# Patient Record
Sex: FEMALE | Race: ASIAN | NOT HISPANIC OR LATINO | Employment: UNEMPLOYED | URBAN - METROPOLITAN AREA
[De-identification: names, ages, dates, MRNs, and addresses within clinical notes are randomized per-mention and may not be internally consistent; named-entity substitution may affect disease eponyms.]

---

## 2017-01-02 ENCOUNTER — TELEPHONE (OUTPATIENT)
Dept: FAMILY MEDICINE | Facility: CLINIC | Age: 62
End: 2017-01-02

## 2017-01-02 NOTE — TELEPHONE ENCOUNTER
Received a fax from [facility]    **Pt requesting new rx for Loperamide 2 mg. If approve - please fax new rx to Pharmacy. Thank you, Kateryna ext [phone]**    Please advise

## 2017-01-09 ENCOUNTER — TELEPHONE (OUTPATIENT)
Dept: FAMILY MEDICINE | Facility: CLINIC | Age: 62
End: 2017-01-09

## 2017-01-09 DIAGNOSIS — R19.7 DIARRHEA, UNSPECIFIED TYPE: Primary | ICD-10-CM

## 2017-01-09 NOTE — TELEPHONE ENCOUNTER
Patient requesting new RX for Loperamide 2mg, if out please send script to [facility] pharmacy

## 2017-01-16 RX ORDER — LOPERAMIDE HYDROCHLORIDE 2 MG/1
TABLET ORAL
Qty: 30 TABLET | Refills: 0 | Status: SHIPPED | OUTPATIENT
Start: 2017-01-16 | End: 2017-10-13

## 2017-03-10 DIAGNOSIS — Z00.00 ENCOUNTER FOR ROUTINE ADULT HEALTH EXAMINATION WITHOUT ABNORMAL FINDINGS: ICD-10-CM

## 2017-03-23 ENCOUNTER — TRANSFERRED RECORDS (OUTPATIENT)
Dept: HEALTH INFORMATION MANAGEMENT | Facility: CLINIC | Age: 62
End: 2017-03-23

## 2017-06-07 DIAGNOSIS — I10 BENIGN ESSENTIAL HYPERTENSION: ICD-10-CM

## 2017-06-07 DIAGNOSIS — E78.5 DYSLIPIDEMIA: ICD-10-CM

## 2017-06-08 RX ORDER — SIMVASTATIN 20 MG
20 TABLET ORAL AT BEDTIME
Qty: 90 TABLET | Refills: 3 | Status: SHIPPED | OUTPATIENT
Start: 2017-06-08 | End: 2018-06-04

## 2017-06-08 RX ORDER — HYDROCHLOROTHIAZIDE 25 MG/1
25 TABLET ORAL DAILY
Qty: 90 TABLET | Refills: 3 | Status: SHIPPED | OUTPATIENT
Start: 2017-06-08 | End: 2018-06-04

## 2017-08-01 DIAGNOSIS — K21.9 GASTROESOPHAGEAL REFLUX DISEASE WITHOUT ESOPHAGITIS: ICD-10-CM

## 2017-08-01 DIAGNOSIS — M77.8 TENDONITIS OF ELBOW, LEFT: ICD-10-CM

## 2017-09-17 ENCOUNTER — HEALTH MAINTENANCE LETTER (OUTPATIENT)
Age: 62
End: 2017-09-17

## 2017-10-13 ENCOUNTER — OFFICE VISIT (OUTPATIENT)
Dept: FAMILY MEDICINE | Facility: CLINIC | Age: 62
End: 2017-10-13

## 2017-10-13 VITALS
DIASTOLIC BLOOD PRESSURE: 87 MMHG | WEIGHT: 149.25 LBS | OXYGEN SATURATION: 97 % | BODY MASS INDEX: 30.09 KG/M2 | SYSTOLIC BLOOD PRESSURE: 131 MMHG | HEART RATE: 82 BPM | TEMPERATURE: 98.2 F | HEIGHT: 59 IN

## 2017-10-13 DIAGNOSIS — Z23 NEED FOR PROPHYLACTIC VACCINATION AND INOCULATION AGAINST INFLUENZA: ICD-10-CM

## 2017-10-13 DIAGNOSIS — Z12.11 SPECIAL SCREENING FOR MALIGNANT NEOPLASMS, COLON: ICD-10-CM

## 2017-10-13 DIAGNOSIS — M25.50 MULTIPLE JOINT PAIN: ICD-10-CM

## 2017-10-13 DIAGNOSIS — J06.9 VIRAL URI WITH COUGH: ICD-10-CM

## 2017-10-13 DIAGNOSIS — R73.03 PREDIABETES: ICD-10-CM

## 2017-10-13 DIAGNOSIS — Z00.00 ROUTINE GENERAL MEDICAL EXAMINATION AT A HEALTH CARE FACILITY: Primary | ICD-10-CM

## 2017-10-13 LAB — GLUCOSE BLDC GLUCOMTR-MCNC: 109 MG/DL (ref 51–110)

## 2017-10-13 RX ORDER — DEXTROMETHORPHAN POLISTIREX 30 MG/5ML
30 SUSPENSION ORAL 2 TIMES DAILY PRN
Qty: 89 ML | Refills: 1 | Status: SHIPPED | OUTPATIENT
Start: 2017-10-13 | End: 2017-11-09

## 2017-10-13 RX ORDER — ACETAMINOPHEN 325 MG/1
650 TABLET ORAL 3 TIMES DAILY PRN
Qty: 100 TABLET | Refills: 0 | Status: SHIPPED | OUTPATIENT
Start: 2017-10-13 | End: 2017-12-05

## 2017-10-13 NOTE — LETTER
October 24, 2017      Priscila Watkins  [address] [address]    Please see below for your test results.    Resulted Orders   Glucose By Meter - Accucheck ([facility])   Result Value Ref Range    Glucose Whole Blood 109.0 51.0 - 110.0 mg/dL   GYN Cytology ([facility])   Result Value Ref Range    Lab AP Case Report SEE RESULTS BELOW       Comment:      Gynecologic Cytology Report                       Case: [id number]                                     Authorizing Provider:  Lainey Tracy MD        Collected:             10/13/2017 1317              First Screen:          KITTY Rodrigues (ASCP)   Received:              10/16/2017 0936              Specimen:    SUREPATH PAP, SCREENING, Endocervical/cervical                                               Lab AP Gyn Interpretation SEE RESULTS BELOW       Comment:      Negative for squamous intraepithelial lesion or malignancy  Electronically signed by KITTY Rodrigues (ASCP) on 10/20/2017 at  3:21 PM      Lab AP Malignant? Normal Normal    Specimen adequacy:       Satisfactory for evaluation, endocervical/transformation zone component present    HPV Reflex? Yes regardless of result     High Risk? No     Last Mens Period post-menopausal     Lab AP Abnormal Bleeding No     Lab AP Patient Status n/a     Lab AP Birth Control/Hormones None     Lab AP Previous Normal 2008     Lab AP Previous Abnl no     Lab AP Cervical Appearance erythematous     Narrative    Test performed by:  [facility] [address] [place] [address]   HPV [place] PCR ([facility])   Result Value Ref Range    Interpretation No HPV Type(s) Detected No HPV Type(s) Detected, No High Risk HP     Eliceo Hernandez MD, [employer]       Comment:         Testing was performed at [facility], [address] [place] [address], with final verification at the indicated laboratory.    Interpretation was performed at [facility] [address] [place] [address]  [place] [address].      Narrative    Test performed by:  [facility] [address] [place] [address]  No HPV Type(s) Detected  Interpretation: The sample is negative for the presence of DNA from one or   more of the following high risk HPV types (16, 18, 31, 33, 35, 39, 45, 51, 52,   56, 58, 66, and 68) and/or probable high or low risk HPV types (2a, 6, 11,   26, 30, 32, 34, 42, 43, 44, 53, 54, 55, 57, 61, 62, 67, 69, 70, 71, 72, 73,   74, 77, 81, 82, 83, 84, 85, 87, 89).  High risk types are classified by the   IARC as carcinogenicity, probably, or possible carcinogenic to humans.    According to the IARC, low risk types are not classifiable as to their   carcingenicity to humans. These results do not exclude the possibility of   additional low or high risk HPV types not detected due to adequacy and   representativeness of sampling or assay sensitivity.  Comments: The absence of low and or high risk HPV types reduces the collective   risk for the development of cervical dysplasia or neoplasia.  This result, in   association with the morphology assessment of the Pap sample are landry   information for the determination of follow-up testing and in the clinical   management of the patient.  Methodology:  Genomic DNA was extracted from the submitted specimen and   amplified by the polymerase chain reaction (PCR) using consensus   oligonucleotide primers for the L1 region of the human papillomavirus (HPV)   genome.  Concurrently, the integrity of the extracted DNA was evaluated by the   amplification of beta-globin, a common housekeeping gene.  Result   interpretation is performed by analysis of peak height and size data generated   through fluorescence detection by automated electrophoresis.  HPV DNA   positive PCR products were subjected to digestion by the restriction   endonucleases Hae III, Pst 1, and Rsa 1.  Digested DNA fragments were    by automated electrophoresis.   Digital electropherograms and gel   images of data were generated and the specific HPV type was determined by   matching the restriction fragment patterns of the respective specimens to that   of known HPV restriction fragment patterns.  The analytical and performance   characteristics of this laboratory-developed test (LDT) were determined by   [facility] pursuant to Clinical Laboratory Improvement Amendments (CLIA 88)   requirements.  It has not been cleared or approved by the U.S. Food and Drug   Administration (FDA).  The FDA has determined that such clearance or approval   is not a requirement prior to use for clinical purposes.     Ms Watkins  I'm covering for Dr Tracy.  Your pap test was normal.  A repeat pap may be done again in 5 years.  Please let us know if you have questions.  ROSA Blackwell

## 2017-10-13 NOTE — LETTER
October 13, 2017      Priscila Watkins  [address] [address]        Dear Priscila,    Please see below for your test results.  Your sugar test was a little high again, but no diabetes.  We should check this every year.     Resulted Orders   Glucose By Meter - Accucheck ([facility])   Result Value Ref Range    Glucose Whole Blood 109.0 51.0 - 110.0 mg/dL       If you have any questions, please call the clinic to make an appointment.    Sincerely,    Lainey Tracy MD

## 2017-10-13 NOTE — NURSING NOTE
Priscila Watkins      1.  Has the patient received the information for the influenza vaccine? YES    2.  Does the patient have any of the following contraindications?     Allergy to eggs? No     Allergic reaction to previous influenza vaccines? No     Any other problems to previous influenza vaccines? No     Paralyzed by Guillain-[place]oh[place] syndrome? No     Currently pregnant? NO     Current moderate or severe illness? No    Vaccination given by Caroline Seay, [facility].  [facility]    May we mail normal lab results to your home? Yes    Patient's preferred contact method if labs are abnormal  Letter mailed to home address

## 2017-10-13 NOTE — PATIENT INSTRUCTIONS
We will contact [facility] for your Mammogram results    Medicine for joint pain sent in to your pharmacy    Cough syrup sent to your pharmacy today    Stop at the lab for a glucose check    Flu shot given today    Preventive Health Recommendations  Female Ages 50 - 64    Yearly exam: See your health care provider every year in order to  o Review health changes.   o Discuss preventive care.    o Review your medicines if your doctor has prescribed any.      Get a Pap test every three years (unless you have an abnormal result and your provider advises testing more often).    If you get Pap tests with HPV test, you only need to test every 5 years, unless you have an abnormal result.     You do not need a Pap test if your uterus was removed (hysterectomy) and you have not had cancer.    You should be tested each year for STDs (sexually transmitted diseases) if you're at risk.     Have a mammogram every 1 to 2 years.    Have a colonoscopy at age 50, or have a yearly FIT test (stool test). These exams screen for colon cancer.      Have a cholesterol test every 5 years, or more often if advised.    Have a diabetes test (fasting glucose) every three years. If you are at risk for diabetes, you should have this test more often.     If you are at risk for osteoporosis (brittle bone disease), think about having a bone density scan (DEXA).    Shots: Get a flu shot each year. Get a tetanus shot every 10 years.    Nutrition:     Eat at least 5 servings of fruits and vegetables each day.    Eat whole-grain bread, whole-wheat pasta and brown rice instead of white grains and rice.    Talk to your provider about Calcium and Vitamin D.     Lifestyle    Exercise at least 150 minutes a week (30 minutes a day, 5 days a week). This will help you control your weight and prevent disease.    Limit alcohol to one drink per day.    No smoking.     Wear sunscreen to prevent skin cancer.     See your dentist every six months for an exam and  cleaning.    See your eye doctor every 1 to 2 years.    Thank you for coming to [facility].  **If you had lab testing today and your results are reassuring or normal they will be be mailed to you within 7 days.   **If the lab tests need quick action we will call you with the results.  The phone number we will call with results is # [phone] (home) . If this is not the best number please call our clinic and change the number.  If you need any refills please call your pharmacy and they will contact us.  If you have any concerns about today's visit or wish to schedule another appointment please call our office during normal business hours [phone] (8-5:00 M-F)  If you have urgent medical concerns please call [phone] at any time of the day.  If you a medical emergency please call [phone]  Again thank you for choosing [facility] and please let us know how we can best partner with you to improve you and your family's health.

## 2017-10-13 NOTE — MR AVS SNAPSHOT
After Visit Summary   10/13/2017    Priscila Watkins    MRN: [id number]           Patient Information     Date Of Birth          1955        Visit Information        Provider Department      10/13/2017 10:20 AM Lainey Tracy MD [facility]        Today's Diagnoses     Routine general medical examination at a health care facility    -  1    Prediabetes        Need for prophylactic vaccination and inoculation against influenza        Special screening for malignant neoplasms, colon        Viral URI with cough        Multiple joint pain          Care Instructions    We will contact [facility] for your Mammogram results    Medicine for joint pain sent in to your pharmacy    Cough syrup sent to your pharmacy today    Stop at the lab for a glucose check    Flu shot given today    Preventive Health Recommendations  Female Ages 50 - 64    Yearly exam: See your health care provider every year in order to  o Review health changes.   o Discuss preventive care.    o Review your medicines if your doctor has prescribed any.      Get a Pap test every three years (unless you have an abnormal result and your provider advises testing more often).    If you get Pap tests with HPV test, you only need to test every 5 years, unless you have an abnormal result.     You do not need a Pap test if your uterus was removed (hysterectomy) and you have not had cancer.    You should be tested each year for STDs (sexually transmitted diseases) if you're at risk.     Have a mammogram every 1 to 2 years.    Have a colonoscopy at age 50, or have a yearly FIT test (stool test). These exams screen for colon cancer.      Have a cholesterol test every 5 years, or more often if advised.    Have a diabetes test (fasting glucose) every three years. If you are at risk for diabetes, you should have this test more often.     If you are at risk for osteoporosis (brittle bone disease), think about having a bone density scan  (DEXA).    Shots: Get a flu shot each year. Get a tetanus shot every 10 years.    Nutrition:     Eat at least 5 servings of fruits and vegetables each day.    Eat whole-grain bread, whole-wheat pasta and brown rice instead of white grains and rice.    Talk to your provider about Calcium and Vitamin D.     Lifestyle    Exercise at least 150 minutes a week (30 minutes a day, 5 days a week). This will help you control your weight and prevent disease.    Limit alcohol to one drink per day.    No smoking.     Wear sunscreen to prevent skin cancer.     See your dentist every six months for an exam and cleaning.    See your eye doctor every 1 to 2 years.    Thank you for coming to [facility].  **If you had lab testing today and your results are reassuring or normal they will be be mailed to you within 7 days.   **If the lab tests need quick action we will call you with the results.  The phone number we will call with results is # [phone] (home) . If this is not the best number please call our clinic and change the number.  If you need any refills please call your pharmacy and they will contact us.  If you have any concerns about today's visit or wish to schedule another appointment please call our office during normal business hours [phone] (8-5:00 M-F)  If you have urgent medical concerns please call [phone] at any time of the day.  If you a medical emergency please call [phone]  Again thank you for choosing [facility] and please let us know how we can best partner with you to improve you and your family's health.                Follow-ups after your visit        Your next 10 appointments already scheduled     Oct 20, 2017 10:30 AM CDT   Flu Shot with BT [facility]   [facility] ([facility])    [address] [facility] [address]   [phone]              Future tests that were ordered for you today     Open Future Orders        Priority Expected Expires Ordered    Fecal Occult Blood -  "FIT, iFOB ([facility]) Routine  10/20/2017 10/13/2017            Who to contact     Please call your clinic at [phone] to:    Ask questions about your health    Make or cancel appointments    Discuss your medicines    Learn about your test results    Speak to your doctor   If you have compliments or concerns about an experience at your clinic, or if you wish to file a complaint, please contact [facility] Patient Relations at [phone] or email us at [email or web address]         Additional Information About Your Visit        [employer] Information     [employer] is an electronic gateway that provides easy, online access to your medical records. With [employer], you can request a clinic appointment, read your test results, renew a prescription or communicate with your care team.     To sign up for [employer] visit the website at [email or web address]   You will be asked to enter the access code listed below, as well as some personal information. Please follow the directions to create your username and password.     Your access code is: [email or web address]  Expires: 2018 12:56 PM     Your access code will  in 90 days. If you need help or a new code, please contact your [facility] Clinic or call [phone] for assistance.        Care EveryWhere ID     This is your Care EveryWhere ID. This could be used by other organizations to access your [facility] medical records  [id number]        Your Vitals Were     Pulse Temperature Height Pulse Oximetry BMI (Body Mass Index)       82 98.2  F (36.8  C) (Oral) 4' 11.45\" (151 cm) 97% 29.69 kg/m2        Blood Pressure from Last 3 Encounters:   10/13/17 131/87   16 129/84   16 129/81    Weight from Last 3 Encounters:   10/13/17 149 lb 4 oz (67.7 kg)   16 151 lb (68.5 kg)   16 158 lb 8 oz (71.9 kg)              We Performed the Following     ADMIN VACCINE, INITIAL     FLU VAC QUADRIVLENT SPLIT " VIRUS IM 0.5ml dosage     Glucose By Meter - Accucheck ([facility])          Today's Medication Changes          These changes are accurate as of: 10/13/17 12:56 PM.  If you have any questions, ask your nurse or doctor.               Start taking these medicines.        Dose/Directions    dextromethorphan 30 MG/5ML liquid   Commonly known as:  DELSYM   Used for:  Viral URI with cough   Started by:  Lainey Tracy MD        Dose:  30 mg   Take 5 mLs (30 mg) by mouth 2 times daily as needed for cough   Quantity:  89 mL   Refills:  1         These medicines have changed or have updated prescriptions.        Dose/Directions    * acetaminophen 500 MG tablet   Commonly known as:  TYLENOL   This may have changed:  Another medication with the same name was added. Make sure you understand how and when to take each.   Used for:  Lumbosacral spondylosis without myelopathy   Changed by:  Viktoriya Wallace MD        Dose:  500-1000 mg   Take 1-2 tablets (500-1,000 mg) by mouth every 6 hours as needed Maximum of 6 pills per day.   Quantity:  120 tablet   Refills:  1       * acetaminophen 325 MG tablet   Commonly known as:  TYLENOL   This may have changed:  You were already taking a medication with the same name, and this prescription was added. Make sure you understand how and when to take each.   Used for:  Multiple joint pain   Changed by:  Lainey Tracy MD        Dose:  650 mg   Take 2 tablets (650 mg) by mouth 3 times daily as needed (joint pain)   Quantity:  100 tablet   Refills:  0       * Notice:  This list has 2 medication(s) that are the same as other medications prescribed for you. Read the directions carefully, and ask your doctor or other care provider to review them with you.         Where to get your medicines      These medications were sent to [facility] - [place], [place] - [facility] [address]  [address], [place] [address]     Phone:  [phone]     acetaminophen 325 MG tablet     dextromethorphan 30 MG/5ML liquid                Primary Care Provider Office Phone # Fax #    Lainey COBURN MD [place] [phone]       [facility] [address] [place] [address]        Equal Access to Services     JOSHUA SNOWDEN : Hadii asif penny rafyo Soomaali, waaxda luqadaha, qaybta kaalmada adeyomaiaryada, benson jenisein hayaashwetha davisyomaira felix woody li. So [facility] [phone].    ATENCIÓN: Si habla español, tiene a maki disposición servicios gratuitos de asistencia lingüística. Llame al [phone].    We comply with applicable federal civil rights laws and [place] laws. We do not discriminate on the basis of race, color, national origin, age, disability, sex, sexual orientation, or gender identity.            Thank you!     Thank you for choosing [facility]  for your care. Our goal is always to provide you with excellent care. Hearing back from our patients is one way we can continue to improve our services. Please take a few minutes to complete the written survey that you may receive in the mail after your visit with us. Thank you!             Your Updated Medication List - Protect others around you: Learn how to safely use, store and throw away your medicines at www.disposemymeds.org.          This list is accurate as of: 10/13/17 12:56 PM.  Always use your most recent med list.                   Brand Name Dispense Instructions for use Diagnosis    * acetaminophen 500 MG tablet    TYLENOL    120 tablet    Take 1-2 tablets (500-1,000 mg) by mouth every 6 hours as needed Maximum of 6 pills per day.    Lumbosacral spondylosis without myelopathy       * acetaminophen 325 MG tablet    TYLENOL    100 tablet    Take 2 tablets (650 mg) by mouth 3 times daily as needed (joint pain)    Multiple joint pain       atovaquone-proguanil 250-100 MG per tablet    MALARONE    50 tablet    Take 1 tablet by mouth daily    Encounter for counseling       * calcium carbonate-vitamin D 500-400 MG-UNIT Tabs per  tablet     180 tablet    Take 1 tablet by mouth daily    Vitamin D deficiency       * calcium carb 1250 mg (500 mg [place])/vitamin D 200 units 500-200 MG-UNIT per tablet    OSCAL with D    90 tablet    Take 1 tablet by mouth 2 times daily (with meals)    Encounter for routine adult health examination without abnormal findings       cholecalciferol 1000 UNIT tablet    vitamin D    100 tablet    Take 1 tablet (1,000 Units) by mouth daily    Hypovitaminosis D       dextromethorphan 30 MG/5ML liquid    DELSYM    89 mL    Take 5 mLs (30 mg) by mouth 2 times daily as needed for cough    Viral URI with cough       diphenhydrAMINE 25 MG tablet    BENADRYL    30 tablet    Take 1-2 tablets by mouth every 6 hours as needed for itching for 11 days.    Urticaria       hydrochlorothiazide 25 MG tablet    HYDRODIURIL    90 tablet    Take 1 tablet (25 mg) by mouth daily    Benign essential hypertension       loperamide 2 MG tablet    IMODIUM A-D    30 tablet    Take 2 tabs (4 mg) after first loose stool, and then take one tab (2 mg) after each diarrheal stool.  Max of 8 tabs (16 mg) per day.    Diarrhea, unspecified type       naproxen 375 MG tablet    NAPROSYN    180 tablet    Take 1 tablet (375 mg) by mouth 2 times daily (with meals)    Tendonitis of elbow, left       ranitidine 150 MG tablet    ZANTAC    180 tablet    Take 1 tablet (150 mg) by mouth 2 times daily as needed for heartburn    Gastroesophageal reflux disease without esophagitis       simvastatin 20 MG tablet    ZOCOR    90 tablet    Take 1 tablet (20 mg) by mouth At Bedtime    Dyslipidemia       * Notice:  This list has 4 medication(s) that are the same as other medications prescribed for you. Read the directions carefully, and ask your doctor or other care provider to review them with you.

## 2017-10-13 NOTE — PROGRESS NOTES
Female Physical Note    Concerns today:   -- Joint pain.  This is been present for 1 month without any associated injury or trauma.  It is most of the legs, knees, and elbows bilaterally.  No associated swelling, redness, or tenderness.  -- Nasal congestion and cough for 1 week.  The cough is nonproductive, and she denies fever or chills.  There is little bit of shortness of breath but no wheezing.    A BlogBus  was used for this visit.    ROS:  GENERAL: No change in weight, sleep or appetite.  Normal energy.  No fever or chills  EYES: Endorses blurry vision in right eye  ENT: No problems with nose or throat.  No difficulty swallowing.  Has known hearing loss.  RESP: Endorses cough and shortness of breath, but no wheezing.  CV: No chest pains or palpitations  GI: No nausea, vomiting,  heartburn, abdominal pain, diarrhea, constipation or change in bowel habits  : No urinary frequency or dysuria, bladder or kidney problems  MUSCULOSKELETAL: Endorses pain of multiple joints.  NEUROLOGIC: No headaches, numbness, tingling, weakness, problems with balance or coordination  PSYCHIATRIC: No problems with anxiety, depression or mental health  HEME/IMMUNE/ALLERGY: No history of bleeding or clotting problems or anemia.  No allergies or immune system problems  ENDOCRINE: No history of thyroid disease, diabetes or other endocrine disorders  SKIN: No rashes,worrisome lesions or skin problems    Sexually Active: No  Sexual concerns: No   Contraception:not needed  No LMP recorded. Patient is postmenopausal.   STD History: Neg  Last Pap Smear Date: 2008, per records  Abnormal Pap History: None    Patient Active Problem List   Diagnosis     Essential hypertension, benign     Lumbosacral spondylosis without myelopathy     Health Care Home     Medial epicondylitis     Hearing loss of both ears     Prediabetes       Current Outpatient Prescriptions   Medication Sig Dispense Refill     dextromethorphan (DELSYM) 30 MG/5ML  liquid Take 5 mLs (30 mg) by mouth 2 times daily as needed for cough 89 mL 1     acetaminophen (TYLENOL) 325 MG tablet Take 2 tablets (650 mg) by mouth 3 times daily as needed (joint pain) 100 tablet 0     naproxen (NAPROSYN) 375 MG tablet Take 1 tablet (375 mg) by mouth 2 times daily (with meals) 180 tablet 3     ranitidine (ZANTAC) 150 MG tablet Take 1 tablet (150 mg) by mouth 2 times daily as needed for heartburn 180 tablet 3     simvastatin (ZOCOR) 20 MG tablet Take 1 tablet (20 mg) by mouth At Bedtime 90 tablet 3     hydrochlorothiazide (HYDRODIURIL) 25 MG tablet Take 1 tablet (25 mg) by mouth daily 90 tablet 3     diphenhydrAMINE (BENADRYL) 25 MG tablet Take 1-2 tablets by mouth every 6 hours as needed for itching for 11 days. 30 tablet 0       History reviewed. No pertinent past medical history.   Patient Active Problem List    Diagnosis Date Noted     Prediabetes 10/13/2017     Priority: Medium     Lab Results   Component Value Date    A1C 5.9 06/30/2016            Hearing loss of both ears 05/29/2014     Priority: Medium     Bilateral profound hearing loss.  ENT note May 2014.       Essential hypertension, benign 12/03/2012     Priority: Medium     Lumbosacral spondylosis without myelopathy 12/03/2012     Priority: Medium     Health Care Home 12/03/2012     Priority: Medium     Tier Level: 1  DX V65.8 REPLACED WITH 09523 HEALTH CARE HOME (04/08/2013)       Medial epicondylitis 12/03/2012     Priority: Medium        Family History        Negative family history of: DIABETES, CANCER, HEART DISEASE          Problem List Medication List and Allergy List were reviewed.    Patient is an established patient of this clinic.    Social History   Substance Use Topics     Smoking status: Never Smoker     Smokeless tobacco: Never Used     Alcohol use No     , 3 children.    Has anyone hurt you physically, for example by pushing, hitting, slapping or kicking you or forcing you to have sex? Denies  Do you feel  "threatened or controlled by a partner, ex-partner or anyone in your life? Denies    RISK BEHAVIORS AND HEALTHY HABITS:  Tobacco Use/Smoking: None  Illicit Drug Use: None  Do you use alcohol? No  Diet (5-7 servings of fruits/veg daily): Yes   Exercise (30 min accumulated most days):Yes, \"pedal machine\" 3-4 days a week  Dental Care: Yes   Calcium 1500 mg/d:  No  Seat Belt Use: Yes     Cholesterol Level (>46 yo or at risk):  Last done in 2016:  The 10-year ASCVD risk score (Ya SCOTT Jr, et al., 2013) is: 4%    Values used to calculate the score:      Age: 62 years      Sex: Female      Is Non- : No      Diabetic: No      Tobacco smoker: No      Systolic Blood Pressure: 129 mmHg      Is BP treated: No      HDL Cholesterol: 48.9 mg/dL      Total Cholesterol: 173.3 mg/dL    Pap/HPV cotest every 5 years for women 30-65: Recommended and patient accepted testing    Colon CA Screening (>50-75 ):  Recommended and patient accepted testing.  FIT negative 10/16/15    Breast CA Screening (>41 yo or 10 y before 1st degree relative diagnosis): Mammogram last done March 2017 - negative    Immunization History   Administered Date(s) Administered     HepA-Adult 12/29/2016     HepB 11/09/2007, 09/30/2008, 12/29/2008     Influenza (IIV3) 11/03/2008, 09/15/2009, 10/08/2010, 09/27/2011, 09/10/2013     Influenza Vaccine, 3 YRS +, IM (QUADRIVALENT W/PRESERVATIVES) 10/13/2015     MMR 12/29/2008     TD (ADULT, 7+) 11/09/2007, 12/29/2008     Tdap (Adacel,Boostrix) 09/30/2008     Typhoid IM 12/29/2016    Reviewed Immunization Record Today    EXAMINATION:   /87  Pulse 82  Temp 98.2  F (36.8  C) (Oral)  Ht 4' 11.45\" (151 cm)  Wt 149 lb 4 oz (67.7 kg)  SpO2 97%  BMI 29.69 kg/m2  GENERAL: healthy, alert and no distress  EYES: Eyes grossly normal to inspection, extraocular movements - intact, and PERRL  HENT: ear canals- normal; TMs- normal; Nose- normal; Mouth- no ulcers, no lesions  NECK: no tenderness, no " adenopathy, no asymmetry, no masses, no stiffness; thyroid- normal to palpation  RESP: lungs clear to auscultation - no rales, no rhonchi, no wheezes  CV: regular rates and rhythm, normal S1 S2, no S3 or S4 and no murmur, no click or rub -  ABDOMEN: soft, no tenderness, no  hepatosplenomegaly, no masses, normal bowel sounds  MS: extremities- no gross deformities noted, no edema  SKIN: no suspicious lesions, no rashes  NEURO: strength and tone- normal, sensory exam- grossly normal, mentation- intact, speech- normal, reflexes- symmetric  BACK: no CVA tenderness, no paralumbar tenderness  - female: cervix- erythematous, no discharge  PSYCH: Alert; speech- coherent , normal rate and volume; able to articulate logical thoughts, able to abstract reason, no tangential thoughts, no hallucinations or delusions, affect- normal  LYMPHATICS: ant. cervical- normal, post. cervical- normal, axillary- normal, supraclavicular- normal    Lab Results   Component Value Date    A1C 5.9 06/30/2016     Results for orders placed or performed in visit on 10/13/17   Glucose By Meter - Accucheck ([facility])   Result Value Ref Range    Glucose Whole Blood 109.0 51.0 - 110.0 mg/dL     ASSESSMENT & PLAN:  Cancer screening:  -- Cervical: Pap last done in 2008 (normal per chart), performed today w/ HPV reflex.  -- Breast: Mammogram last done in 2017 (negative).  Continue mammography at least q2y.  -- Colon: FIT last done in 2015 (negative), will repeat today.  Recommend annually.    Disease screening/pervention:  -- History of prediabetes (A1c 5.9 in 2016), sugar 109 today.  Continue annual monitoring for diabetes.  -- History of HTN, on HCTZ.  Declined blood draw.  If amenable next time, should repeat BMP.  BP good today - 131/87.  -- Flu shot given today.    Other:  -- Viral cough with URI.  Ordered cough suppressant syrup (dextromethorphan).  -- Joint pain.  Refilled acetaminophen.    Return to clinic as needed.

## 2017-10-17 DIAGNOSIS — Z12.11 SPECIAL SCREENING FOR MALIGNANT NEOPLASMS, COLON: ICD-10-CM

## 2017-10-17 LAB — HEMOCCULT STL QL IA: NEGATIVE

## 2017-10-19 LAB
INTERPRETATION: NORMAL
INTERPRETER: NORMAL

## 2017-10-20 ENCOUNTER — RECORDS - HEALTHEAST (OUTPATIENT)
Dept: ADMINISTRATIVE | Facility: OTHER | Age: 62
End: 2017-10-20

## 2017-10-20 LAB
CYTOLOGY CVX/VAG DOC THIN PREP: NORMAL
HIGH RISK?: NO
HPV REFLEX?: NORMAL
LAB AP ABNORMAL BLEEDING: NO
LAB AP BIRTH CONTROL/HORMONES: NORMAL
LAB AP CASE REPORT: NORMAL
LAB AP CERVICAL APPEARANCE: NORMAL
LAB AP MALIGNANT?: NORMAL
LAB AP PATIENT STATUS: NORMAL
LAB AP PREVIOUS ABNL: NO
LAB AP PREVIOUS NORMAL: 2008
LAST MENS PERIOD: NORMAL
SPECIMEN ADEQUACY:: NORMAL

## 2017-10-22 NOTE — PROGRESS NOTES
Ms Watkins  I'm covering for Dr Tracy.  Your pap test was normal.  A repeat pap may be done again in 5 years.  Please let us know if you have questions.  ROSA Blackwell

## 2017-11-09 DIAGNOSIS — J06.9 VIRAL URI WITH COUGH: ICD-10-CM

## 2017-11-09 RX ORDER — DEXTROMETHORPHAN POLISTIREX 30 MG/5ML
30 SUSPENSION ORAL 2 TIMES DAILY PRN
Qty: 89 ML | Refills: 1 | Status: SHIPPED | OUTPATIENT
Start: 2017-11-09 | End: 2018-08-08

## 2017-12-05 DIAGNOSIS — M25.50 MULTIPLE JOINT PAIN: ICD-10-CM

## 2017-12-05 RX ORDER — ACETAMINOPHEN 325 MG/1
650 TABLET ORAL 3 TIMES DAILY PRN
Qty: 100 TABLET | Refills: 0 | Status: SHIPPED | OUTPATIENT
Start: 2017-12-05 | End: 2018-01-09

## 2018-01-09 DIAGNOSIS — M25.50 MULTIPLE JOINT PAIN: ICD-10-CM

## 2018-01-10 RX ORDER — ACETAMINOPHEN 325 MG/1
650 TABLET ORAL 3 TIMES DAILY PRN
Qty: 100 TABLET | Refills: 0 | Status: SHIPPED | OUTPATIENT
Start: 2018-01-10 | End: 2019-09-24

## 2018-02-07 ENCOUNTER — OFFICE VISIT (OUTPATIENT)
Dept: FAMILY MEDICINE | Facility: CLINIC | Age: 63
End: 2018-02-07
Payer: COMMERCIAL

## 2018-02-07 ENCOUNTER — TRANSFERRED RECORDS (OUTPATIENT)
Dept: HEALTH INFORMATION MANAGEMENT | Facility: CLINIC | Age: 63
End: 2018-02-07

## 2018-02-07 VITALS
SYSTOLIC BLOOD PRESSURE: 149 MMHG | BODY MASS INDEX: 30.16 KG/M2 | OXYGEN SATURATION: 98 % | WEIGHT: 151.6 LBS | TEMPERATURE: 98.7 F | DIASTOLIC BLOOD PRESSURE: 87 MMHG | HEART RATE: 83 BPM

## 2018-02-07 DIAGNOSIS — Z01.818 PREOP GENERAL PHYSICAL EXAM: Primary | ICD-10-CM

## 2018-02-07 LAB
BUN SERPL-MCNC: 16.1 MG/DL (ref 7–19)
CALCIUM SERPL-MCNC: 9.6 MG/DL (ref 8.5–10.1)
CHLORIDE SERPLBLD-SCNC: 98 MMOL/L (ref 98–110)
CO2 SERPL-SCNC: 24.8 MMOL/L (ref 20–32)
CREAT SERPL-MCNC: 0.8 MG/DL (ref 0.5–1)
GFR SERPL CREATININE-BSD FRML MDRD: 77.2 ML/MIN/1.7 M2
GLUCOSE SERPL-MCNC: 120.3 MG'DL (ref 70–99)
HEMOGLOBIN: 11 G/DL (ref 11.7–15.7)
POTASSIUM SERPL-SCNC: 3.4 MMOL/DL (ref 3.2–4.6)
SODIUM SERPL-SCNC: 134.6 MMOL/L (ref 132–142)

## 2018-02-07 NOTE — PROGRESS NOTES
Preceptor attestation:  Patient seen and discussed with the resident. Assessment and plan reviewed with resident and agreed upon.  Supervising physician: Conor Evangelista  [facility]

## 2018-02-07 NOTE — PROGRESS NOTES
[address] [address]  Phone: [phone]  Fax: [phone]    2/7/2018    Adult PRE-OP Evaluation:    Priscila Watkins, 1955 presents for pre-operative evaluation and assessment as requested by hand surgeon, prior to undergoing surgery/procedure for treatment of right hand  Proposed procedure: ORIF of wrist/hand    Date of Surgery/ Procedure: 2/8/18  Hospital/Surgical Facility: [place], Fax: [phone]     Primary Physician: Lainey Tracy  Type of Anesthesia Anticipated: General and Spinal  History of anesthesia complications: NONE  History of  abnormal bleeding: NONE   History of blood transfusions: NO  Patient has a Health Care Directive or Living Will:  NO    Preoperative Questions   1. NO - Do you have a history of heart attack, stroke, stent, bypass or surgery on an artery in the head, neck, heart or legs?  2. NO - Do you ever have any pain or discomfort in your chest?  3. NO - Have you ever had a severe pain across the front of your chest lasting for half an hour or more?  4. NO - Do you have a history of Congestive Heart Failure?  5. NO - Are you troubled by shortness of breath when: walking on the level/ up a slight hill/ at night?  6. NO - Does your chest ever sound wheezy or whistling?  7. NO - Do you currently have a cold, bronchitis or other respiratory infection?  8. NO - Have you had a cold, bronchitis or other respiratory infection within the last 2 weeks?  9. NO - Do you usually have a cough?  10. NO - Do you sometimes get pains in the calves of your legs when you walk?  11. NO - Do you or anyone in your family have previous history of blood clots?  12. NO - Do you or does anyone in your family have a serious bleeding problem such as prolonged bleeding following surgeries or cuts?  13. NO - Have you ever had problems with anemia or been told to take iron pills?  14. NO - Have you had any abnormal blood loss such as black, tarry or bloody stools, or abnormal  vaginal bleeding?  15. NO - Have you ever had a blood transfusion?  16. NO - Have you or any of your relatives ever had problems with anesthesia?  17. NO - Do you have sleep apnea, excessive snoring or daytime drowsiness?  18. NO - Do you have any prosthetic heart valves?  19. NO - Do you have prosthetic joints?  20. NO - Is there any chance that you may be pregnant?    Patient Active Problem List   Diagnosis     Essential hypertension, benign     Lumbosacral spondylosis without myelopathy     Health Care Home     Medial epicondylitis     Hearing loss of both ears     Prediabetes         Current Outpatient Prescriptions on File Prior to Visit:  acetaminophen (TYLENOL) 325 MG tablet Take 2 tablets (650 mg) by mouth 3 times daily as needed (joint pain)   dextromethorphan (DELSYM) 30 MG/5ML liquid Take 5 mLs (30 mg) by mouth 2 times daily as needed for cough   naproxen (NAPROSYN) 375 MG tablet Take 1 tablet (375 mg) by mouth 2 times daily (with meals)   ranitidine (ZANTAC) 150 MG tablet Take 1 tablet (150 mg) by mouth 2 times daily as needed for heartburn   simvastatin (ZOCOR) 20 MG tablet Take 1 tablet (20 mg) by mouth At Bedtime   hydrochlorothiazide (HYDRODIURIL) 25 MG tablet Take 1 tablet (25 mg) by mouth daily   diphenhydrAMINE (BENADRYL) 25 MG tablet Take 1-2 tablets by mouth every 6 hours as needed for itching for 11 days.     No current facility-administered medications on file prior to visit.     OTC products: None, except as noted above    Allergies   Allergen Reactions     Nkda [No Known Drug Allergies]      Latex Allergy: NO    Social History     Social History     Marital status:      Spouse name: N/A     Number of children: N/A     Years of education: N/A     Social History Main Topics     Smoking status: Never Smoker     Smokeless tobacco: Never Used     Alcohol use No     Drug use: No     Sexual activity: Not on file     Other Topics Concern     Not on file     Social History Narrative        REVIEW OF SYSTEMS:   Constitutional, HEENT, cardiovascular, pulmonary, gi and gu systems are negative, except as otherwise noted.    EXAM:   No data found.    There is no height or weight on file to calculate BMI.  GENERAL: healthy, alert and no distress  EYES: Eyes grossly normal to inspection, extraocular movements - intact, and PERRL  HENT: ear canals- normal; TMs- normal; Nose- normal; Mouth- no ulcers, no lesions  NECK: no tenderness, no adenopathy, no asymmetry, no masses  RESP: lungs clear to auscultation - no rales, no rhonchi, no wheezes  CV: regular rates and rhythm, normal S1 S2, no S3 or S4 and no murmur, no click or rub -  ABDOMEN: soft, no tenderness, no  hepatosplenomegaly, no masses, normal bowel sounds  MS: extremities- Splint in place on the RUE, can wiggle right fingers without trouble  SKIN: no suspicious lesions, no rashes  NEURO: strength and tone- normal, sensory exam- grossly normal, mentation- intact, speech- normal, reflexes- symmetric  BACK: no CVA tenderness, no paralumbar tenderness  PSYCH: Alert and oriented times 3; speech- coherent , normal rate and volume; able to articulate logical thoughts  LYMPHATICS: ant. cervical- normal, post. cervical- normal    DIAGNOSTICS:      EKG: appears normal, NSR, normal axis, normal intervals, no acute ST/T changes c/w ischemia, no LVH by voltage criteria, unchanged from previous tracings  Hemoglobin (indicated for history of anemia or procedure with significant blood loss such as tonsillectomy, major intraperitoneal surgery, vascular surgery, major spine surgery, total joint replacement)  Serum Potassium  Serum Creatinine    RISK ASSESSMENT:     Cardiovascular Risk:  -Patient is able to walk up a flight of stairs without chest pain.  -The patient does not have chest pain at rest or with exertion  -Patient does not have a history of congestive heart failure.    -The patient does not have a history of stroke and does not have a history of  valvular disease.    Pulmonary Risk:  -In terms of risk factors for pulmonary complication, the patient is older then 60    Perioperative Complications:  -The patient does not have a history of bleeding or clotting problems in the past.    -The patient has not had complications from surgeries.    -The patient does not have a family history of any anesthesia or surgical complications.      IMPRESSION:   Reason for surgery/procedure: fracture x2    The proposed surgical procedure is considered INTERMEDIATE risk.    For above listed surgery and anesthesia:   Patient is at low risk for surgery/procedure and perioperative/procedure complications.    RECOMMENDATIONS:     Labs:  Hgb, K+ and Creatininge    Hemoglobin: 11.0    Fasting:  NPO for 12 hours prior to surgery    Preop Plan:  --Approval given to proceed with proposed procedure, without further diagnostic evaluation    Medications:  Patient should take their regular medications the morning of surgery unless otherwise instructed.    Hold ibuprofen for 5 days prior.    Discussed with Dr. Evangelista.    Ricky Gallegos, DO    Please contact our office if there are any further questions or information required about this patient.

## 2018-02-07 NOTE — MR AVS SNAPSHOT
After Visit Summary   2/7/2018    Priscila Watkins    MRN: [id number]           Patient Information     Date Of Birth          1955        Visit Information        Provider Department      2/7/2018 2:30 PM Ricky Gallegos,  [facility]        Today's Diagnoses     Preop general physical exam    -  1      Care Instructions      Presurgery Checklist  You are scheduled to have surgery. The healthcare staff will try to make your stay comfortable. Use the guidelines below to remind yourself what to do before surgery. Be sure to follow any specific pre-op instructions from your surgeon or nurse.   Preparing for Surgery  Ask your surgeon if you ll need a blood transfusion during surgery and if so, how to prepare for it. In some cases, you can donate blood before surgery. If needed, this blood can be given back (transfused) to you during or after surgery.  If you are having abdominal surgery, ask what you need to do to clear your bowel.  Tell your surgeon if you have allergies to any medications or foods.  Arrange for an adult family member or friend to drive you home after surgery. If possible, have someone ready to help you at home as you recover.  Call the surgeon if you get a cold, fever, sore throat, diarrhea, or other health problem just before surgery. Your surgeon can decide whether or not to postpone the surgery.  Medications  Tell your surgeon about all medications you take, including prescription and over-the-counter products such as herbal remedies and vitamins. Ask if you should continue taking them.  If you take ibuprofen, naproxen, or  blood thinners  such as aspirin, clopidogrel (Plavix), or warfarin (Coumadin), ask your surgeon whether you should stop taking them and how long before surgery you should stop.  You may be told to take antibiotics just before surgery to prevent infection. If so, follow instructions carefully on how to take them.  If you are told to take medications  called anticoagulants to prevent blood clots after surgery, be sure to follow the instructions on how to take them.  Stop Smoking  If you smoke, healing may take longer. So at least 2 week(s) before surgery, stop smoking.  Bathing or Showering Before Surgery  If instructed, wash with antibacterial soap. Afterward, do not use lotions or powders.  If you are having surgery on the head, you may be asked to shampoo with antibacterial soap. Follow instructions for doing so.  Do Not Remove Hair from the Surgery Site  Do not shave hair from the incision site, unless you are given specific instructions to do so. Usually, if hair needs to be removed, it will be done at the hospital right before surgery.  Don t Eat or Drink  Your doctor will tell you when to stop eating and drinking. If you do not follow your doctor's instructions, your procedure may be postponed or rescheduled for another day.  If your surgeon tells you to continue any medications, take them with small sips of water.  You can brush your teeth and rinse your mouth, but don t swallow any water.  Day of Surgery  Do not wear makeup. Do not use perfume, deodorant, or hairspray. Remove nail polish and artificial nails.  Leave jewelry (including rings), watches, and other valuables at home.  Be sure to bring health insurance cards or forms and a photo ID.  Bring a list of your medications (include the name, dose, how often you take them, and the time last dose was taken).  Arrive on time at the hospital or surgery facility.          Follow-ups after your visit        Who to contact     Please call your clinic at [phone] to:    Ask questions about your health    Make or cancel appointments    Discuss your medicines    Learn about your test results    Speak to your doctor            Additional Information About Your Visit        Green[employer] Information     [employer]Cloud is an electronic gateway that provides easy, online access to your medical records. With [employer]Cloud, you  can request a clinic appointment, read your test results, renew a prescription or communicate with your care team.     To sign up for [employer] visit the website at [email or web address]   You will be asked to enter the access code listed below, as well as some personal information. Please follow the directions to create your username and password.     Your access code is: [email or web address]  Expires: 5/15/2018  1:17 PM     Your access code will  in 90 days. If you need help or a new code, please contact your [facility] or call [phone] for assistance.        Care EveryWhere ID     This is your Care EveryWhere ID. This could be used by other organizations to access your [facility] medical records  [id number]        Your Vitals Were     Pulse Temperature Pulse Oximetry BMI (Body Mass Index)          83 98.7  F (37.1  C) (Oral) 98% 30.16 kg/m2         Blood Pressure from Last 3 Encounters:   18 149/87   10/13/17 131/87   16 129/84    Weight from Last 3 Encounters:   18 151 lb 9.6 oz (68.8 kg)   10/13/17 149 lb 4 oz (67.7 kg)   16 151 lb (68.5 kg)              We Performed the Following     Basic Metabolic Panel ([facility])     EKG 12-lead complete w/read - Clinics     Hemoglobin (HGB) ([facility])        Primary Care Provider Office Phone # Fax #    Lainey ALMAS Tracy [phone]       [facility] [address] [place] [address]        Equal Access to Services     JOSHUA SNOWDEN : Hadii asif penny hadasho Sojigneshali, waaxda luqadaha, qaybta kaalmada benson kauffman . So [facility] [phone].    ATENCIÓN: Si habla español, tiene a maki disposición servicios gratuitos de asistencia lingüística. Llame al [phone].    We comply with applicable federal civil rights laws and [place] laws. We do not discriminate on the basis of race, color, national origin, age, disability, sex, sexual orientation, or  gender identity.            Thank you!     Thank you for choosing [facility]  for your care. Our goal is always to provide you with excellent care. Hearing back from our patients is one way we can continue to improve our services. Please take a few minutes to complete the written survey that you may receive in the mail after your visit with us. Thank you!             Your Updated Medication List - Protect others around you: Learn how to safely use, store and throw away your medicines at www.disposemymeds.org.          This list is accurate as of 2/7/18 11:59 PM.  Always use your most recent med list.                   Brand Name Dispense Instructions for use Diagnosis    acetaminophen 325 MG tablet    TYLENOL    100 tablet    Take 2 tablets (650 mg) by mouth 3 times daily as needed (joint pain)    Multiple joint pain       dextromethorphan 30 MG/5ML liquid    DELSYM    89 mL    Take 5 mLs (30 mg) by mouth 2 times daily as needed for cough    Viral URI with cough       diphenhydrAMINE 25 MG tablet    BENADRYL    30 tablet    Take 1-2 tablets by mouth every 6 hours as needed for itching for 11 days.    Urticaria       hydrochlorothiazide 25 MG tablet    HYDRODIURIL    90 tablet    Take 1 tablet (25 mg) by mouth daily    Benign essential hypertension       naproxen 375 MG tablet    NAPROSYN    180 tablet    Take 1 tablet (375 mg) by mouth 2 times daily (with meals)    Tendonitis of elbow, left       ranitidine 150 MG tablet    ZANTAC    180 tablet    Take 1 tablet (150 mg) by mouth 2 times daily as needed for heartburn    Gastroesophageal reflux disease without esophagitis       simvastatin 20 MG tablet    ZOCOR    90 tablet    Take 1 tablet (20 mg) by mouth At Bedtime    Dyslipidemia

## 2018-02-20 ENCOUNTER — TRANSFERRED RECORDS (OUTPATIENT)
Dept: HEALTH INFORMATION MANAGEMENT | Facility: CLINIC | Age: 63
End: 2018-02-20

## 2018-03-21 ENCOUNTER — TRANSFERRED RECORDS (OUTPATIENT)
Dept: HEALTH INFORMATION MANAGEMENT | Facility: CLINIC | Age: 63
End: 2018-03-21

## 2018-03-28 DIAGNOSIS — Z00.00 PREVENTATIVE HEALTH CARE: Primary | ICD-10-CM

## 2018-03-29 DIAGNOSIS — Z00.00 PREVENTATIVE HEALTH CARE: ICD-10-CM

## 2018-03-29 LAB — MAMMOGRAM: NORMAL

## 2018-03-29 NOTE — LETTER
April 3, 2018      Priscila Watkins  [address] [address]        Dear Priscila,    Your mammogram was good. There was no breast cancer seen.    Please see below for your test results.    Resulted Orders   PCS Use: Screening Digital Bilateral Mammogram   Result Value Ref Range    MAMMOGRAM         If you have any questions, please call the clinic to make an appointment.    Sincerely,    Lainey Tracy MD

## 2018-04-02 NOTE — PROGRESS NOTES
BREAST DENSITY: There are scattered areas of fibroglandular density.  FINDINGS: There is no mammographic evidence of malignancy.  IMPRESSION: ACR BI-RADS Category 1: Negative.

## 2018-04-30 ENCOUNTER — TRANSFERRED RECORDS (OUTPATIENT)
Dept: HEALTH INFORMATION MANAGEMENT | Facility: CLINIC | Age: 63
End: 2018-04-30

## 2018-06-04 DIAGNOSIS — E78.5 DYSLIPIDEMIA: ICD-10-CM

## 2018-06-04 DIAGNOSIS — I10 BENIGN ESSENTIAL HYPERTENSION: ICD-10-CM

## 2018-06-06 RX ORDER — HYDROCHLOROTHIAZIDE 25 MG/1
25 TABLET ORAL DAILY
Qty: 90 TABLET | Refills: 3 | Status: SHIPPED | OUTPATIENT
Start: 2018-06-06 | End: 2019-06-04

## 2018-06-06 RX ORDER — SIMVASTATIN 20 MG
20 TABLET ORAL AT BEDTIME
Qty: 90 TABLET | Refills: 3 | Status: SHIPPED | OUTPATIENT
Start: 2018-06-06 | End: 2019-06-04

## 2018-06-25 ENCOUNTER — TRANSFERRED RECORDS (OUTPATIENT)
Dept: HEALTH INFORMATION MANAGEMENT | Facility: CLINIC | Age: 63
End: 2018-06-25

## 2018-07-31 ENCOUNTER — TRANSFERRED RECORDS (OUTPATIENT)
Dept: HEALTH INFORMATION MANAGEMENT | Facility: CLINIC | Age: 63
End: 2018-07-31

## 2018-08-03 DIAGNOSIS — M77.8 TENDONITIS OF ELBOW, LEFT: ICD-10-CM

## 2018-08-07 DIAGNOSIS — K21.9 GASTROESOPHAGEAL REFLUX DISEASE WITHOUT ESOPHAGITIS: ICD-10-CM

## 2018-08-08 ENCOUNTER — OFFICE VISIT (OUTPATIENT)
Dept: FAMILY MEDICINE | Facility: CLINIC | Age: 63
End: 2018-08-08
Payer: COMMERCIAL

## 2018-08-08 VITALS
TEMPERATURE: 98.7 F | SYSTOLIC BLOOD PRESSURE: 130 MMHG | RESPIRATION RATE: 16 BRPM | OXYGEN SATURATION: 97 % | DIASTOLIC BLOOD PRESSURE: 85 MMHG | HEART RATE: 70 BPM

## 2018-08-08 DIAGNOSIS — R73.03 PREDIABETES: ICD-10-CM

## 2018-08-08 DIAGNOSIS — Z00.00 ROUTINE GENERAL MEDICAL EXAMINATION AT A HEALTH CARE FACILITY: Primary | ICD-10-CM

## 2018-08-08 DIAGNOSIS — I10 BENIGN ESSENTIAL HYPERTENSION: ICD-10-CM

## 2018-08-08 LAB
BUN SERPL-MCNC: 8.2 MG/DL (ref 7–19)
CALCIUM SERPL-MCNC: 10.2 MG/DL (ref 8.5–10.1)
CHLORIDE SERPLBLD-SCNC: 102.8 MMOL/L (ref 98–110)
CHOLEST SERPL-MCNC: 196.9 MG/DL (ref 0–200)
CHOLEST/HDLC SERPL: 3.8 {RATIO} (ref 0–5)
CO2 SERPL-SCNC: 25.5 MMOL/L (ref 20–32)
CREAT SERPL-MCNC: 0.7 MG/DL (ref 0.5–1)
GFR SERPL CREATININE-BSD FRML MDRD: >90 ML/MIN/1.7 M2
GLUCOSE SERPL-MCNC: 81.9 MG'DL (ref 70–99)
HBA1C MFR BLD: 5.5 % (ref 4.1–5.7)
HDLC SERPL-MCNC: 52.5 MG/DL
HIV 1+2 AB+HIV1 P24 AG SERPL QL IA: NEGATIVE
LDLC SERPL CALC-MCNC: 90 MG/DL (ref 0–129)
POTASSIUM SERPL-SCNC: 3.6 MMOL/DL (ref 3.2–4.6)
SODIUM SERPL-SCNC: 136.7 MMOL/L (ref 132–142)
TRIGL SERPL-MCNC: 274.4 MG/DL (ref 0–150)
VLDL CHOLESTEROL: 54.9 MG/DL (ref 7–32)

## 2018-08-08 NOTE — MR AVS SNAPSHOT
After Visit Summary   8/8/2018    Priscila Watkins    MRN: [id number]           Patient Information     Date Of Birth          1955        Visit Information        Provider Department      8/8/2018 10:40 AM Lainey Tracy MD [facility]        Today's Diagnoses     Routine general medical examination at a health care facility    -  1    Prediabetes        Benign essential hypertension          Care Instructions      Preventive Health Recommendations  Female Ages 50 - 64    Yearly exam: See your health care provider every year in order to  o Review health changes.   o Discuss preventive care.    o Review your medicines if your doctor has prescribed any.      Get a Pap test every three years (unless you have an abnormal result and your provider advises testing more often).    If you get Pap tests with HPV test, you only need to test every 5 years, unless you have an abnormal result.     You do not need a Pap test if your uterus was removed (hysterectomy) and you have not had cancer.    You should be tested each year for STDs (sexually transmitted diseases) if you're at risk.     Have a mammogram every 1 to 2 years.    Have a colonoscopy at age 50, or have a yearly FIT test (stool test). These exams screen for colon cancer.      Have a cholesterol test every 5 years, or more often if advised.    Have a diabetes test (fasting glucose) every three years. If you are at risk for diabetes, you should have this test more often.     If you are at risk for osteoporosis (brittle bone disease), think about having a bone density scan (DEXA).    Shots: Get a flu shot each year. Get a tetanus shot every 10 years.    Nutrition:     Eat at least 5 servings of fruits and vegetables each day.    Eat whole-grain bread, whole-wheat pasta and brown rice instead of white grains and rice.    Get adequate Calcium and Vitamin D.     Lifestyle    Exercise at least 150 minutes a week (30 minutes a day, 5 days a week). This  will help you control your weight and prevent disease.    Limit alcohol to one drink per day.    No smoking.     Wear sunscreen to prevent skin cancer.     See your dentist every six months for an exam and cleaning.    See your eye doctor every 1 to 2 years.            Follow-ups after your visit        Who to contact     Please call your clinic at [phone] to:    Ask questions about your health    Make or cancel appointments    Discuss your medicines    Learn about your test results    Speak to your doctor            Additional Information About Your Visit        [email or web address] Information     [employer] is an electronic gateway that provides easy, online access to your medical records. With [employer], you can request a clinic appointment, read your test results, renew a prescription or communicate with your care team.     To sign up for [employer] visit the website at [email or web address]   You will be asked to enter the access code listed below, as well as some personal information. Please follow the directions to create your username and password.     Your access code is: [email or web address]  Expires: 2018  9:06 AM     Your access code will  in 90 days. If you need help or a new code, please contact your [facility] or call [phone] for assistance.        Care EveryWhere ID     This is your Care EveryWhere ID. This could be used by other organizations to access your [place] medical records  [id number]        Your Vitals Were     Pulse Temperature Respirations Pulse Oximetry          70 98.7  F (37.1  C) (Oral) 16 97%         Blood Pressure from Last 3 Encounters:   18 130/85   18 149/87   10/13/17 131/87    Weight from Last 3 Encounters:   18 151 lb 9.6 oz (68.8 kg)   10/13/17 149 lb 4 oz (67.7 kg)   16 151 lb (68.5 kg)              We Performed the Following     Basic Metabolic Panel ([place])     Hemoglobin A1c ([facility])     Hepatitis C Antibody  ([facility])     HIV Ag/Ab Screen [place] ([facility])     Lipid Panel ([place])          Today's Medication Changes          These changes are accurate as of 8/8/18 11:59 PM.  If you have any questions, ask your nurse or doctor.               Stop taking these medicines if you haven't already. Please contact your care team if you have questions.     dextromethorphan 30 MG/5ML liquid   Commonly known as:  DELSYM   Stopped by:  Lainey Tracy MD           diphenhydrAMINE 25 MG tablet   Commonly known as:  BENADRYL   Stopped by:  Lainey Tracy MD                    Primary Care Provider Office Phone # Fax #    Lainey Tracy [phone]       [address] [place] [address]        Equal Access to Services     [facility]: Hadii aad ku hadasho Sodanny, waaxda luqadaha, qaybta kaalmada adeegyada, waxay jenisein jacqueline mckay . So [facility] [phone].    ATENCIÓN: Si habla español, tiene a maki disposición servicios gratuitos de asistencia lingüística. [facility] [phone].    We comply with applicable federal civil rights laws and [place] laws. We do not discriminate on the basis of race, color, national origin, age, disability, sex, sexual orientation, or gender identity.            Thank you!     Thank you for choosing [facility]  for your care. Our goal is always to provide you with excellent care. Hearing back from our patients is one way we can continue to improve our services. Please take a few minutes to complete the written survey that you may receive in the mail after your visit with us. Thank you!             Your Updated Medication List - Protect others around you: Learn how to safely use, store and throw away your medicines at www.disposemymeds.org.          This list is accurate as of 8/8/18 11:59 PM.  Always use your most recent med list.                   Brand Name Dispense Instructions for use Diagnosis    acetaminophen 325 MG tablet    TYLENOL    100  tablet    Take 2 tablets (650 mg) by mouth 3 times daily as needed (joint pain)    Multiple joint pain       hydrochlorothiazide 25 MG tablet    HYDRODIURIL    90 tablet    Take 1 tablet (25 mg) by mouth daily    Benign essential hypertension       naproxen 375 MG tablet    NAPROSYN    180 tablet    Take 1 tablet (375 mg) by mouth 2 times daily (with meals)    Tendonitis of elbow, left       ranitidine 150 MG tablet    ZANTAC    180 tablet    Take 1 tablet (150 mg) by mouth 2 times daily as needed for heartburn    Gastroesophageal reflux disease without esophagitis       simvastatin 20 MG tablet    ZOCOR    90 tablet    Take 1 tablet (20 mg) by mouth At Bedtime    Dyslipidemia

## 2018-08-08 NOTE — PROGRESS NOTES
Female Physical Note    Concerns today:   Chief Complaint   Patient presents with     Physical     Pt is here for a physical today.     Medication Reconciliation     Complete.      Review of Systems  Negative:  -- Eye: vision change, double vision, red eyes  -- HEENT: hearing change, sinus pain, throat pain  -- MSK: joint pain, muscle pain, swelling  -- Constitutional: fever, chills, weight change, tiredness, sleep problems  -- GI: nausea, vomiting, heartburn, diarrhea, constipation, stomach pain  -- Heme: concerning bumps, bleeding problems  -- CV: chest pain, heart flutters, pain with walking  -- Psych: depression, anxiety  -- Neuro: headache, loss of strength or feeling, numbness or tingling, dizziness  -- : change in urine, leakage of urine, sexual problems  -- Resp: wheezing, cough, difficulty breathing  -- Endo: very sensitivity to heat or cold, very thirsty    Sexually Active: No  Sexual concerns: No   Contraception:not needed - postmenopausal  STD History: Neg    Patient Active Problem List   Diagnosis     Essential hypertension, benign     Lumbosacral spondylosis without myelopathy     Health Care Home     Medial epicondylitis     Hearing loss of both ears     Prediabetes       Current Outpatient Prescriptions   Medication Sig Dispense Refill     acetaminophen (TYLENOL) 325 MG tablet Take 2 tablets (650 mg) by mouth 3 times daily as needed (joint pain) 100 tablet 0     hydrochlorothiazide (HYDRODIURIL) 25 MG tablet Take 1 tablet (25 mg) by mouth daily 90 tablet 3     naproxen (NAPROSYN) 375 MG tablet Take 1 tablet (375 mg) by mouth 2 times daily (with meals) 180 tablet 3     ranitidine (ZANTAC) 150 MG tablet Take 1 tablet (150 mg) by mouth 2 times daily as needed for heartburn 180 tablet 3     simvastatin (ZOCOR) 20 MG tablet Take 1 tablet (20 mg) by mouth At Bedtime 90 tablet 3       Past Medical History:   Diagnosis Date     Hypertension         Family History        Negative family history of:  Diabetes, Cancer, HEART DISEASE          Problem List Medication List and Allergy List were reviewed.    Patient is an established patient of this clinic.    Social History   Substance Use Topics     Smoking status: Never Smoker     Smokeless tobacco: Never Used     Alcohol use No     , 3 children.    Has anyone hurt you physically, for example by pushing, hitting, slapping or kicking you or forcing you to have sex? Denies  Do you feel threatened or controlled by a partner, ex-partner or anyone in your life? Denies    RISK BEHAVIORS AND HEALTHY HABITS:  Tobacco Use/Smoking: None  Illicit Drug Use: None  Do you use alcohol? No  Diet (5-7 servings of fruits/veg daily): Yes   Exercise (30 min accumulated most days):Yes - does a bike at home, or enjoys walking outside  Dental Care: Yes   Calcium 1500 mg/d:  No  Seat Belt Use: Yes     Immunization History   Administered Date(s) Administered     HepA-Adult 12/29/2016     HepB 11/09/2007, 09/30/2008, 12/29/2008     Influenza (IIV3) PF 11/03/2008, 09/15/2009, 10/08/2010, 09/27/2011, 09/10/2013     Influenza Vaccine, 3 YRS +, IM (QUADRIVALENT W/PRESERVATIVES) 10/13/2015, 10/13/2017     MMR 12/29/2008     TD (ADULT, 7+) 11/09/2007, 12/29/2008     Tdap (Adacel,Boostrix) 09/30/2008     Typhoid IM 12/29/2016    Reviewed Immunization Record Today    EXAMINATION:   /85 (BP Location: Left arm, Patient Position: Sitting, Cuff Size: Adult Regular)  Pulse 70  Temp 98.7  F (37.1  C) (Oral)  Resp 16  SpO2 97%  GENERAL: healthy, alert and no distress  EYES: Eyes grossly normal to inspection, extraocular movements - intact, and PERRL  HENT: ear canals- normal; TMs- normal; Nose- normal; Mouth- no ulcers, no lesions  NECK: no tenderness, no adenopathy, no asymmetry, no masses, no stiffness; thyroid- normal to palpation  RESP: lungs clear to auscultation - no rales, no rhonchi, no wheezes  CV: regular rates and rhythm, normal S1 S2, no S3 or S4 and no murmur, no click or  rub -  ABDOMEN: soft, no tenderness, no  hepatosplenomegaly, no masses, normal bowel sounds  MS: extremities- no gross deformities noted, no edema  SKIN: no suspicious lesions, no rashes  NEURO: strength and tone- normal, sensory exam- grossly normal, mentation- intact, speech- normal, reflexes- symmetric  BACK: no CVA tenderness, no paralumbar tenderness  PSYCH: Alert and oriented times 3; speech- coherent , normal rate and volume; able to articulate logical thoughts, able to abstract reason, no tangential thoughts, no hallucinations or delusions, affect- normal  LYMPHATICS: ant. cervical- normal, post. cervical- normal, supraclavicular- normal    Assessment & Plan  Cancer screening:  -- Cervical: Last Pap 10/13/17.  Normal cytology and negative HPV.  Plan repeat in 2022.  -- Breast: Last mammogram 3/26/18.  Repeat in March 2019.  -- Colon: No previous colonoscopy.  Does annual FIT testing.  Last done 10/17/17.  Ordered another FIT for this Fall.    Disease screening:  -- HIV: Recommend one time screening.  Ordered.  -- Hep C:  Born 1955 (between 1945 and 1965).  Recommend one time screening.  Ordered.    Other:  -- History of prediabetes (A1c 5.9 in 2016).  Repeat A1c today and continue annual screening.  Also checking lipids.  -- History of HTN.  On hydrochlorothiazide.  Check BMP.    Return to clinic annually for CPE.

## 2018-08-08 NOTE — LETTER
August 13, 2018      Priscilamarques Watkins  [address] [address]        Dear Priscilamarques Watkins,    Your tests looked good.  Your kidneys were normal, HIV was negative (normal), and hepatitis C was negative (normal).  Your cholesterol is looking better than before, and you do not have diabetes.    Please see below for your test results.    Resulted Orders   Lipid Panel ([place])   Result Value Ref Range    Cholesterol 196.9 0.0 - 200.0 mg/dL    Cholesterol/HDL Ratio 3.8 0.0 - 5.0    HDL Cholesterol 52.5 >40.0 mg/dL    LDL Cholesterol Calculated 90 0 - 129 mg/dL    Triglycerides 274.4 (H) 0.0 - 150.0 mg/dL    VLDL Cholesterol 54.9 (H) 7.0 - 32.0 mg/dL   Basic Metabolic Panel ([place])   Result Value Ref Range    Urea Nitrogen 8.2 7.0 - 19.0 mg/dL    Calcium 10.2 (H) 8.5 - 10.1 mg/dL    Chloride 102.8 98.0 - 110.0 mmol/L    Carbon Dioxide 25.5 20.0 - 32.0 mmol/L    Creatinine 0.7 0.5 - 1.0 mg/dL    Glucose 81.9 70.0 - 99.0 mg'dL    Potassium 3.6 3.2 - 4.6 mmol/dL    Sodium 136.7 132.0 - 142.0 mmol/L    GFR Estimate >90 >60.0 mL/min/1.7 m2    GFR Estimate If Black >90 >60.0 mL/min/1.7 m2   Hemoglobin A1c ([facility])   Result Value Ref Range    Hemoglobin A1C 5.5 4.1 - 5.7 %   HIV Ag/Ab Screen [place] [facility]   Result Value Ref Range    HIV Antigen/Antibody Negative Negative    Narrative    Test performed by:  [facility] [address] [place] [address]  Method is Abbott HIV Ag/Ab for the detection of HIV p24 antigen, HIV-1   antibodies and HIV-2 antibodies.   Hepatitis C Antibody ([facility])   Result Value Ref Range    Hepatitis C Antibody Screen Negative Negative    Narrative    Test performed by:  [facility] [address] [place] [address]       If you have any questions, please call the clinic to make an appointment.    Sincerely,    Lainey Tracy MD

## 2018-08-09 LAB — HCV AB SER QL: NEGATIVE

## 2018-08-13 NOTE — PROGRESS NOTES
Dear Priscila Watkins,    Your tests looked good.  Your kidneys were normal, HIV was negative (normal), and hepatitis C was negative (normal).  Your cholesterol is looking better than before, and you do not have diabetes.    Lainey Tracy MD

## 2018-09-21 ENCOUNTER — OFFICE VISIT (OUTPATIENT)
Dept: FAMILY MEDICINE | Facility: CLINIC | Age: 63
End: 2018-09-21
Payer: COMMERCIAL

## 2018-09-21 VITALS
HEART RATE: 72 BPM | TEMPERATURE: 98.2 F | RESPIRATION RATE: 20 BRPM | OXYGEN SATURATION: 98 % | SYSTOLIC BLOOD PRESSURE: 114 MMHG | BODY MASS INDEX: 31.15 KG/M2 | DIASTOLIC BLOOD PRESSURE: 73 MMHG | WEIGHT: 156.6 LBS

## 2018-09-21 DIAGNOSIS — M25.522 LEFT ELBOW PAIN: Primary | ICD-10-CM

## 2018-09-21 DIAGNOSIS — R73.03 PREDIABETES: ICD-10-CM

## 2018-09-21 ASSESSMENT — PAIN SCALES - GENERAL: PAINLEVEL: SEVERE PAIN (6)

## 2018-09-21 NOTE — MR AVS SNAPSHOT
"              After Visit Summary   9/21/2018    Priscila Watkins    MRN: [id number]           Patient Information     Date Of Birth          1955        Visit Information        Provider Department      9/21/2018 10:00 AM Lainey Tracy MD [facility]        Today's Diagnoses     Left elbow pain    -  1    Prediabetes          Care Instructions    Neck: Try heat (rice in a sock and warm in the microwave) and exercises on printout.  If not better after 2 weeks, schedule appointment for \"OMT\" with Dr. Andrea.                                        Neck Tension Rehabilitation Exercises   You may do all of these exercises right away but avoid any movements that increase your pain.     Neck rotation with flexion:   Right: Turn your head to the right and clasp your hands behind your head. Let the weight of your arms pull your chin to the right side of your chest. Relax. Hold for a count of 15. Do this 3 times.   Left: Turn your head to the left and clasp your hands behind your head. Let the weight of your arms pull your chin to the left side of your chest. Relax. Hold for a count of 15. Do this 3 times.     Chin tuck: Place your fingertips on your chin and gently push your head straight back as if you are trying to make a double chin. Keep looking forward as your head moves back. Hold 5 seconds and repeat 5 times.     Scalene stretch: This stretches the neck muscles that attach to your ribs. Sitting in an upright position, clasp both hands behind your back, lower your left shoulder, and tilt your head toward the right. Hold this position for 15 to 30 seconds and then come back to the starting position. Lower your right shoulder and tilt your head toward the left until you feel a stretch. Hold for 15 to 30 seconds. Repeat 3 times on each side.     Neck rotation stretch   Right side: Rotate your neck by looking over your right shoulder. Lift your right hand and place your palm on the left side of your chin. Push " your chin with your palm toward your right shoulder. Hold for a count of 10. Do this 3 times.   Left side: Rotate your neck by looking over your left shoulder. Lift your left hand and place your palm on the right side of your chin. Push your chin with your palm toward your left shoulder. Hold for a count of 10. Do this 3 times.     Scapular squeeze: While sitting or standing with your arms by your sides, squeeze your shoulder blades together and hold for 5 seconds. Do 3 sets of 10.     Thoracic extension: While sitting in a chair, clasp both arms behind your head. Gently arch backward and look up toward the ceiling. Repeat 10 times. Do this several times per day.                                   Follow-ups after your visit        Who to contact     Please call your clinic at [phone] to:    Ask questions about your health    Make or cancel appointments    Discuss your medicines    Learn about your test results    Speak to your doctor            Additional Information About Your Visit        [facility] Information     [employer] is an electronic gateway that provides easy, online access to your medical records. With [employer], you can request a clinic appointment, read your test results, renew a prescription or communicate with your care team.     To sign up for [employer] visit the website at [email or web address]   You will be asked to enter the access code listed below, as well as some personal information. Please follow the directions to create your username and password.     Your access code is: [email or web address]  Expires: 2018  9:06 AM     Your access code will  in 90 days. If you need help or a new code, please contact your [facility] or call [phone] for assistance.        Care EveryWhere ID     This is your Care EveryWhere ID. This could be used by other organizations to access your [place] medical records  [id number]        Your Vitals Were     Pulse Temperature  Respirations Pulse Oximetry BMI (Body Mass Index)       72 98.2  F (36.8  C) (Oral) 20 98% 31.15 kg/m2        Blood Pressure from Last 3 Encounters:   09/21/18 114/73   08/08/18 130/85   02/07/18 149/87    Weight from Last 3 Encounters:   09/21/18 156 lb 9.6 oz (71 kg)   02/07/18 151 lb 9.6 oz (68.8 kg)   10/13/17 149 lb 4 oz (67.7 kg)              Today, you had the following     No orders found for display         Today's Medication Changes          These changes are accurate as of 9/21/18 11:40 AM.  If you have any questions, ask your nurse or doctor.               Start taking these medicines.        Dose/Directions    order for DME   Used for:  Left elbow pain        Equipment being ordered: left elbow brace   Quantity:  1 each   Refills:  0            Where to get your medicines      Some of these will need a paper prescription and others can be bought over the counter.  Ask your nurse if you have questions.     Bring a paper prescription for each of these medications     order for DME                Primary Care Provider Office Phone # Fax #    Lainey ALMAS Tracy -[phone]       [address] [place] [address]        Equal Access to Services     JOSHUA SNOWDEN AH: Carlton Carnes, wakassy hernández, qabillta kaalmada daly, benson li. So [facility] [phone].    ATENCIÓN: Si habla español, tiene a maki disposición servicios gratuitos de asistencia lingüística. Nova al [phone].    We comply with applicable federal civil rights laws and [place] laws. We do not discriminate on the basis of race, color, national origin, age, disability, sex, sexual orientation, or gender identity.            Thank you!     Thank you for choosing [facility]  for your care. Our goal is always to provide you with excellent care. Hearing back from our patients is one way we can continue to improve our services. Please take a few minutes to complete the written  survey that you may receive in the mail after your visit with us. Thank you!             Your Updated Medication List - Protect others around you: Learn how to safely use, store and throw away your medicines at www.disposemymeds.org.          This list is accurate as of 9/21/18 11:40 AM.  Always use your most recent med list.                   Brand Name Dispense Instructions for use Diagnosis    acetaminophen 325 MG tablet    TYLENOL    100 tablet    Take 2 tablets (650 mg) by mouth 3 times daily as needed (joint pain)    Multiple joint pain       hydrochlorothiazide 25 MG tablet    HYDRODIURIL    90 tablet    Take 1 tablet (25 mg) by mouth daily    Benign essential hypertension       naproxen 375 MG tablet    NAPROSYN    180 tablet    Take 1 tablet (375 mg) by mouth 2 times daily (with meals)    Tendonitis of elbow, left       order for DME     1 each    Equipment being ordered: left elbow brace    Left elbow pain       ranitidine 150 MG tablet    ZANTAC    180 tablet    Take 1 tablet (150 mg) by mouth 2 times daily as needed for heartburn    Gastroesophageal reflux disease without esophagitis       simvastatin 20 MG tablet    ZOCOR    90 tablet    Take 1 tablet (20 mg) by mouth At Bedtime    Dyslipidemia

## 2018-09-21 NOTE — NURSING NOTE
Due to patient being non-English speaking/uses sign language, an  was used for this visit. Only for face-to-face interpretation by an external agency, date and length of interpretation can be found on the scanned worksheet.     name: Antonino ID: [id number]  Agency: [employer]  Language: Leeroy   Telephone number:   Type of interpretation: Telemedicine, spoken     Patient Waiver of  Services form reviewed with pt today and signed by pt. Daughter will come w/ pt to office visits.  Discussed w/Dr. Tracy. Chen Best MA

## 2018-09-21 NOTE — PATIENT INSTRUCTIONS
"Neck: Try heat (rice in a sock and warm in the microwave) and exercises on printout.  If not better after 2 weeks, schedule appointment for \"OMT\" with Dr. Andrea.                                        Neck Tension Rehabilitation Exercises   You may do all of these exercises right away but avoid any movements that increase your pain.     Neck rotation with flexion:   Right: Turn your head to the right and clasp your hands behind your head. Let the weight of your arms pull your chin to the right side of your chest. Relax. Hold for a count of 15. Do this 3 times.   Left: Turn your head to the left and clasp your hands behind your head. Let the weight of your arms pull your chin to the left side of your chest. Relax. Hold for a count of 15. Do this 3 times.     Chin tuck: Place your fingertips on your chin and gently push your head straight back as if you are trying to make a double chin. Keep looking forward as your head moves back. Hold 5 seconds and repeat 5 times.     Scalene stretch: This stretches the neck muscles that attach to your ribs. Sitting in an upright position, clasp both hands behind your back, lower your left shoulder, and tilt your head toward the right. Hold this position for 15 to 30 seconds and then come back to the starting position. Lower your right shoulder and tilt your head toward the left until you feel a stretch. Hold for 15 to 30 seconds. Repeat 3 times on each side.     Neck rotation stretch   Right side: Rotate your neck by looking over your right shoulder. Lift your right hand and place your palm on the left side of your chin. Push your chin with your palm toward your right shoulder. Hold for a count of 10. Do this 3 times.   Left side: Rotate your neck by looking over your left shoulder. Lift your left hand and place your palm on the right side of your chin. Push your chin with your palm toward your left shoulder. Hold for a count of 10. Do this 3 times.     Scapular squeeze: While " sitting or standing with your arms by your sides, squeeze your shoulder blades together and hold for 5 seconds. Do 3 sets of 10.     Thoracic extension: While sitting in a chair, clasp both arms behind your head. Gently arch backward and look up toward the ceiling. Repeat 10 times. Do this several times per day.

## 2018-09-21 NOTE — PROGRESS NOTES
"Subjective   Priscila Watkins is a 63 year old female with a history including HTN, prediabetes, hearing loss, and back pain who presents with neck and elbow pain.    She has had pain in the left side of her neck for the past 2 weeks, without any injury. She describes the pain as \"ends crawling under her skin.\" The pain is exacerbated by turning her face toward her right shoulder. The pain is present all the time. She denies shooting pains from her neck down her arm.     She has also been having pain in her left elbow. She says this is similar to \"arthritis\" that she had before in both elbows and both knees. The left elbow pain gets worse throughout the day and improves slightly with activity. When the elbow is most painful, she also has numbness and tingling in the fingertips of digits 2-5 on her left hand. She does not wake up with numb fingers at night. She continues to take naproxen with good relief.     She is concerned about 2 bumps on the PIP of her left 3rd digit that she first noticed 2 days ago. The joint hurts when she squeezes it and when she bends her finger.     Social: Non-smoker.    Objective   Vitals: /73  Pulse 72  Temp 98.2  F (36.8  C) (Oral)  Resp 20  Wt 156 lb 9.6 oz (71 kg)  SpO2 98%  BMI 31.15 kg/m2  General: Well-appearing, well-nourished female in no acute distress.   MSK: Neck: Tender muscle in left posterolateral neck. Muscular tenderness at the medial border of the L scapula -- elicits a pain that shoots from the medial scapula to the left side of the neck and reproduces her pain. Pain with active lateral neck flexion to the right and active neck rotation to the right in the horizontal plane. Pain-free AROM for neck forward flexion, extension, and leftward flexion and rotation.   L shoulder: no tenderness to palpation. Full AROM except smooth abduction limited to 50-60 degrees above the horizontal, this is symmetric with the R shoulder. Able to fully abduct with second effort.   L " elbow: Tenderness at medial epicondyle. Medial epicondyle palpated during active motion and no ulnar subluxation detected.   L hand: 3rd digit PIP tender to palpation and slightly larger than adjacent and contralateral PIPs. No effusion, erythema, or warmth. The condyles of the proximal phalanx at the PIP appear prominently underneath the skin when the PIP is fully flexed. Full ROM of all digits.   Strength: 5/5 shoulder abduction, flexion and extension at the elbow, flexion and extension at the wrist, hand , and finger abduction.     Assessment & Plan   Muscle sprain, left neck: Palpation medial to the scapula reproduces her neck pain, as does neck flexion and rotation to the right. The trapezius muscle is most likely to be affected, with this long area of tenderness. She is not having shooting pains to her arm, so nerve impingement is unlikely.  -- printed instructions for heat therapy and stretching provided  -- they will consider OMT if not improved in 2 weeks    Ulnar neuropathy: She has tenderness at the ulnar groove in the elbow as well as numbness and tingling in digits 2-5.   -- instructed to straighten the elbow with a towel or brace at night    Finger nodules, left third digit PIP: These may be normal bone or nodules due to osteoarthritis or rheumatoid arthritis. For now the patient will monitor to see if they become larger or more painful.     Return to clinic as needed for new or worsening symptoms.    Kim Morris, MS4    --------------    The medical student acted as a scribe and the encounter documented about was performed completely by me.  The documentation accurately reflects the work I have performed today.  Lainey Tracy MD

## 2018-10-24 ENCOUNTER — ALLIED HEALTH/NURSE VISIT (OUTPATIENT)
Dept: FAMILY MEDICINE | Facility: CLINIC | Age: 63
End: 2018-10-24
Payer: COMMERCIAL

## 2018-10-24 VITALS — TEMPERATURE: 98.2 F

## 2018-10-24 DIAGNOSIS — Z23 NEED FOR VACCINATION: Primary | ICD-10-CM

## 2018-10-24 NOTE — MR AVS SNAPSHOT
After Visit Summary   10/24/2018    Priscila Watkins    MRN: [id number]           Patient Information     Date Of Birth          1955        Visit Information        Provider Department      10/24/2018 9:40 AM [facility]        Today's Diagnoses     Need for vaccination    -  1       Follow-ups after your visit        Follow-up notes from your care team     Return if symptoms worsen or fail to improve.      Who to contact     Please call your clinic at [phone] to:    Ask questions about your health    Make or cancel appointments    Discuss your medicines    Learn about your test results    Speak to your doctor            Additional Information About Your Visit        MyChart Information     [email or web address] is an electronic gateway that provides easy, online access to your medical records. With [email or web address], you can request a clinic appointment, read your test results, renew a prescription or communicate with your care team.     To sign up for Internet college internation S.L.t visit the website at [email or web address]   You will be asked to enter the access code listed below, as well as some personal information. Please follow the directions to create your username and password.     Your access code is: [email or web address]  Expires: 2018  9:06 AM     Your access code will  in 90 days. If you need help or a new code, please contact your [facility] or call [phone] for assistance.        Care EveryWhere ID     This is your Care EveryWhere ID. This could be used by other organizations to access your [place] medical records  [id number]        Your Vitals Were     Temperature                   98.2  F (36.8  C) (Tympanic)            Blood Pressure from Last 3 Encounters:   18 114/73   18 130/85   18 149/87    Weight from Last 3 Encounters:   18 156 lb 9.6 oz (71 kg)   18 151 lb 9.6 oz (68.8 kg)   10/13/17 149 lb 4 oz (67.7 kg)              We  Performed the Following     ADMIN VACCINE, INITIAL     FLU VAC QUADRIVLENT SPLIT VIRUS IM 0.5ml dosage        Primary Care Provider Office Phone # Fax #    Lainey ALMAS Tracy [phone]       [address] [place] [address]        Equal Access to Services     SALINASINTIA ISI : Hadii asif ku hadcastilloo Soomaali, waaxda luqadaha, qaybta kaalmada adeegyada, waxay idiin hayjoryn susanyomaira felix woody li. So [facility] [phone].    ATENCIÓN: Si habla español, tiene a maki disposición servicios gratuitos de asistencia lingüística. Llame al [phone].    We comply with applicable federal civil rights laws and [place] laws. We do not discriminate on the basis of race, color, national origin, age, disability, sex, sexual orientation, or gender identity.            Thank you!     Thank you for choosing [facility]  for your care. Our goal is always to provide you with excellent care. Hearing back from our patients is one way we can continue to improve our services. Please take a few minutes to complete the written survey that you may receive in the mail after your visit with us. Thank you!             Your Updated Medication List - Protect others around you: Learn how to safely use, store and throw away your medicines at www.disposemymeds.org.          This list is accurate as of 10/24/18  9:51 AM.  Always use your most recent med list.                   Brand Name Dispense Instructions for use Diagnosis    acetaminophen 325 MG tablet    TYLENOL    100 tablet    Take 2 tablets (650 mg) by mouth 3 times daily as needed (joint pain)    Multiple joint pain       hydrochlorothiazide 25 MG tablet    HYDRODIURIL    90 tablet    Take 1 tablet (25 mg) by mouth daily    Benign essential hypertension       naproxen 375 MG tablet    NAPROSYN    180 tablet    Take 1 tablet (375 mg) by mouth 2 times daily (with meals)    Tendonitis of elbow, left       order for DME     1 each    Equipment being ordered: left elbow brace     Left elbow pain       ranitidine 150 MG tablet    ZANTAC    180 tablet    Take 1 tablet (150 mg) by mouth 2 times daily as needed for heartburn    Gastroesophageal reflux disease without esophagitis       simvastatin 20 MG tablet    ZOCOR    90 tablet    Take 1 tablet (20 mg) by mouth At Bedtime    Dyslipidemia

## 2018-10-24 NOTE — NURSING NOTE
Injectable influenza vaccine documentation    1. Has the patient received the information for the influenza vaccine? YES    2. Does the patient have a severe allergy to eggs (Patients with a severe egg allergy should be assessed by a medical provider, RN, or clinical pharmacist. If they receive the influenza vaccine, please have them observed for 15 minutes.)? No    3. Has the patient had an allergic reaction to previous influenza vaccines? No    4. Has the patient had any severe allergic reactions to past influenza vaccines ? No       5. Does patient have a history of Guillain-Strathmere syndrome? No        Based on responses above, I administered the influenza vaccine.  Laine ALEJO

## 2019-03-28 DIAGNOSIS — Z12.31 ENCOUNTER FOR SCREENING MAMMOGRAM FOR BREAST CANCER: Primary | ICD-10-CM

## 2019-06-03 DIAGNOSIS — E78.5 DYSLIPIDEMIA: ICD-10-CM

## 2019-06-03 DIAGNOSIS — I10 BENIGN ESSENTIAL HYPERTENSION: ICD-10-CM

## 2019-06-04 RX ORDER — SIMVASTATIN 20 MG
20 TABLET ORAL AT BEDTIME
Qty: 90 TABLET | Refills: 3 | Status: SHIPPED | OUTPATIENT
Start: 2019-06-04 | End: 2020-05-22

## 2019-06-04 RX ORDER — HYDROCHLOROTHIAZIDE 25 MG/1
25 TABLET ORAL DAILY
Qty: 90 TABLET | Refills: 3 | Status: SHIPPED | OUTPATIENT
Start: 2019-06-04 | End: 2020-05-22

## 2019-08-13 DIAGNOSIS — M77.8 TENDONITIS OF ELBOW, LEFT: ICD-10-CM

## 2019-08-13 DIAGNOSIS — K21.9 GASTROESOPHAGEAL REFLUX DISEASE WITHOUT ESOPHAGITIS: ICD-10-CM

## 2019-09-24 ENCOUNTER — OFFICE VISIT (OUTPATIENT)
Dept: FAMILY MEDICINE | Facility: CLINIC | Age: 64
End: 2019-09-24
Payer: COMMERCIAL

## 2019-09-24 VITALS
BODY MASS INDEX: 31.53 KG/M2 | TEMPERATURE: 99 F | OXYGEN SATURATION: 96 % | SYSTOLIC BLOOD PRESSURE: 124 MMHG | HEART RATE: 73 BPM | DIASTOLIC BLOOD PRESSURE: 74 MMHG | RESPIRATION RATE: 18 BRPM | WEIGHT: 156.4 LBS | HEIGHT: 59 IN

## 2019-09-24 DIAGNOSIS — M25.562 CHRONIC PAIN OF BOTH KNEES: Primary | ICD-10-CM

## 2019-09-24 DIAGNOSIS — G89.29 CHRONIC PAIN OF BOTH KNEES: Primary | ICD-10-CM

## 2019-09-24 DIAGNOSIS — R73.03 PREDIABETES: ICD-10-CM

## 2019-09-24 DIAGNOSIS — I10 ESSENTIAL HYPERTENSION, BENIGN: ICD-10-CM

## 2019-09-24 DIAGNOSIS — M25.561 CHRONIC PAIN OF BOTH KNEES: Primary | ICD-10-CM

## 2019-09-24 DIAGNOSIS — Z00.00 HEALTHCARE MAINTENANCE: ICD-10-CM

## 2019-09-24 DIAGNOSIS — R06.02 SHORTNESS OF BREATH: ICD-10-CM

## 2019-09-24 LAB
BUN SERPL-MCNC: 10.2 MG/DL (ref 7–19)
CALCIUM SERPL-MCNC: 9.6 MG/DL (ref 8.5–10.1)
CHLORIDE SERPLBLD-SCNC: 99.9 MMOL/L (ref 98–110)
CO2 SERPL-SCNC: 29.3 MMOL/L (ref 20–32)
CREAT SERPL-MCNC: 0.8 MG/DL (ref 0.5–1)
GFR SERPL CREATININE-BSD FRML MDRD: 76.8 ML/MIN/1.7 M2
GLUCOSE SERPL-MCNC: 99.7 MG'DL (ref 70–99)
HBA1C MFR BLD: 5.5 % (ref 4.1–5.7)
HEMOCCULT STL QL IA: NEGATIVE
POTASSIUM SERPL-SCNC: 4.1 MMOL/DL (ref 3.2–4.6)
SODIUM SERPL-SCNC: 134.2 MMOL/L (ref 132–142)

## 2019-09-24 ASSESSMENT — PAIN SCALES - GENERAL: PAINLEVEL: MODERATE PAIN (5)

## 2019-09-24 ASSESSMENT — MIFFLIN-ST. JEOR: SCORE: 1165.06

## 2019-09-24 NOTE — PATIENT INSTRUCTIONS
Knees: It looks like the cartilage behind your knee cap is causing the pain.  This can wear down when it rubs on the thigh bone.  We'll do an x-ray to look at it, and get physical therapy.  At physical therapy, they will teach you exercises to balance your muscles so the knee cap doesn't rub on the bone.    Breathing: I don't hear pneumonia.  It doesn't look like you have allergies or fluid in the lungs.  We'll do a breathing test.    Others: Blood work today (check for diabetes, check kidneys) and stool test (yearly cancer check).    PHYSICAL THERAPY REFERRAL   September 26, 2019 Demographics and referral for Physical Therapy faxed to [facility] at [phone].     [facility]  Phone: [phone]  Fax: [phone]  Scheduling Hours: Monday - Friday, 7 am to 4:30 pm    [facility]  [address] [place] [address]    [facility]  [facility]   [address], [address]  [place] [address]    [facility]  [address] [place] [address]    [facility]  [address], [address]  [place] [address]    [facility]  [address] [place] [address]    Faxed Referral over they will contact the patient. SAPNA Corey

## 2019-09-24 NOTE — PROGRESS NOTES
Assessment & Plan   Atraumatic sub-acute bilateral knee pain.  Unclear cause, and exam is nearly entirely benign aside from infrapatellar tenderness bilaterally.  This is most likely patellofemoral syndrome.  -- We'll get some plain films to evaluate for degenerative changes or abnormal bony involvement given her age.  -- Physical therapy ordered.    Isolated shortness of breath -- no wheezing, cough, allergy symptoms, fluid, etc to suggest clearly what the cause is.  O2 saturation is good (96%).  -- Spirometry performed today.  FEV1/FVC >70% (73-80%) and FVC just <80% (78%), so overall picture of mild restrictive disease, though the third trial had to be ignored due to poor quality, which makes me question the quality of the preceding trials as well.  -- This may overall be deconditioning due to knee pain impacting her ability to walk.  I reassured her that her exam and findings are not concerning, and we'll see how her breathing capacity improves with reduced knee pain.      Healthcare maintenance.  -- FIT test.  -- History of prediabetes.  Check A1c.  -- History of HTN, on hydrochlorothiazide 25.  Check BMP.    Future Appointments   Date Time Provider Department Center   10/22/2019  2:10 PM Lainey Tracy MD [facility]       Subjective   Priscila Watkins is a 64 year old female with a history including prediabetes and HTN who presents for two reasons: knee pain and trouble breathing.  Overall, walking is one of her favorite activities, and both of these things are interfering with her ability to walk.  She likes to walk 30-40 minutes but can now only walk about 15 minutes.    Regarding the knee pain, she describes bilateral knee pain for the past 2 months.  It hurts to walk, and she has to go up stairs two feet per stair now.  There was no preceding injury or trauma.  No locking or giving out.  She has not tried anything for it yet.    Regarding the trouble breathing, she has also had this for the pats 2  months.  No associated cough, wheezing, runny nose, orthopnea, or leg swelling.  When it happens, she tries to breathe through her mouth more.    Social: She reports that she has never smoked. She has never used smokeless tobacco.    She has waived her right to a formal .  She is accompanied by her English-speaking daughter.    Objective   Vitals: /74   Pulse 73   Temp 99  F (37.2  C) (Oral)   Resp 18   Wt 70.9 kg (156 lb 6.4 oz)   SpO2 96%   BMI 31.11 kg/m    General: Pleasant. Older woman. No distress.  HEENT: Moist mucous membranes. No nasal discharge. Nasal turbinates non-edematous. Oropharynx without swelling, erythema, or exudate. No cervical lymphadenopathy.  Heart: Regular rate and rhythm. No murmurs, rubs, or gallops.  Extremities: No peripheral edema.  Lungs: Clear to auscultation bilaterally. No wheezes or crackles. Good air movement.  Knees: Normal to inspection.  No effusion.  Able to bear weight.  Full ROM in extension and flexion.  No joint line tenderness. No ligamentous instability.  Negative Pepe's.  Positive infrapatellar tenderness bilaterally.    Spirometry today (9/24/19):  3 trials of breathing capacity spirometry were done.  The third trial was poor quality.  Overall, FVC is decreased at 78% predicted.  FEV1/FVC also slightly decreased at 73 and 80% absolutely.  Bronchodilator testing not performed.

## 2019-09-27 NOTE — RESULT ENCOUNTER NOTE
Bilateral knee x-ray 9/24/2019    Impression:  Right knee: Normal alignment without a fracture or dislocation.  No effusion.  No appreciable degenerative changes.  Left knee: Normal alignment without fracture or dislocation.  Minimal spurring along the lateral femoral condyle.  There are 2 tiny soft tissue calcifications in the soft tissues FCI between the lower patellar margin the tibial tubercle on the lateral view.  Trace effusion. Statement Selected

## 2019-09-30 ENCOUNTER — AMBULATORY - HEALTHEAST (OUTPATIENT)
Dept: ADMINISTRATIVE | Facility: REHABILITATION | Age: 64
End: 2019-09-30

## 2019-09-30 DIAGNOSIS — M25.561 CHRONIC PAIN OF BOTH KNEES: ICD-10-CM

## 2019-09-30 DIAGNOSIS — M25.562 CHRONIC PAIN OF BOTH KNEES: ICD-10-CM

## 2019-09-30 DIAGNOSIS — G89.29 CHRONIC PAIN OF BOTH KNEES: ICD-10-CM

## 2019-10-22 ENCOUNTER — OFFICE VISIT (OUTPATIENT)
Dept: FAMILY MEDICINE | Facility: CLINIC | Age: 64
End: 2019-10-22
Payer: COMMERCIAL

## 2019-10-22 VITALS
HEIGHT: 60 IN | DIASTOLIC BLOOD PRESSURE: 77 MMHG | TEMPERATURE: 97.7 F | HEART RATE: 75 BPM | WEIGHT: 156.2 LBS | OXYGEN SATURATION: 98 % | BODY MASS INDEX: 30.67 KG/M2 | RESPIRATION RATE: 20 BRPM | SYSTOLIC BLOOD PRESSURE: 154 MMHG

## 2019-10-22 DIAGNOSIS — G89.29 CHRONIC PAIN OF BOTH KNEES: Primary | ICD-10-CM

## 2019-10-22 DIAGNOSIS — Z23 NEED FOR PROPHYLACTIC VACCINATION AND INOCULATION AGAINST INFLUENZA: ICD-10-CM

## 2019-10-22 DIAGNOSIS — M25.561 CHRONIC PAIN OF BOTH KNEES: Primary | ICD-10-CM

## 2019-10-22 DIAGNOSIS — M25.562 CHRONIC PAIN OF BOTH KNEES: Primary | ICD-10-CM

## 2019-10-22 RX ORDER — NAPROXEN SODIUM 220 MG
220 TABLET ORAL 2 TIMES DAILY WITH MEALS
Qty: 60 TABLET | Refills: 0 | Status: SHIPPED | OUTPATIENT
Start: 2019-10-22 | End: 2020-05-22

## 2019-10-22 ASSESSMENT — MIFFLIN-ST. JEOR: SCORE: 1174.4

## 2019-10-22 ASSESSMENT — PAIN SCALES - GENERAL: PAINLEVEL: SEVERE PAIN (7)

## 2019-10-22 NOTE — PROGRESS NOTES
Assessment & Plan   Right knee pain x3 months - today it was learned that it was associated with a fall, whereas previously it was felt to be atraumatic.  No fracture or dislocation on x-ray 1 month ago.  Discussed management options including medications, PT, braces, injections, etc.  -- She would like to restart naproxen that was helpful in the past.  -- Right knee brace also ordered.    Healthcare maintenance.  Flu shot given today.    Return to clinic if knee pain not improving with this plan after another month.    Subjective   Priscila Watkins is a 64 year old female with a history including prediabetes and HTN who presents for follow-up of knee pain.    She was last seen for this 1 month ago (9/24/19), at which time she was complaining of atraumatic bilateral knee pain x2 months, which was felt to be patellofemoral syndrome based on history and physical.  X-rays were obtained, which showed a normal right knee, and left knee with minimal spurring along the lateral femoral condyle and trace effusion.    Today, she reports that she continues to have knee pain, right worse than left, making it hard to walk like she enjoys.  She describes today having a fall on to the right knee after twisting her ankle 3 months ago (when the pain started).  So far, she has not tried anything for it, but she found naproxen helpful in the past.    Social: She reports that she has never smoked. She has never used smokeless tobacco.     name: Maribel Acosta  Agency: [employer]  Language: Olga/Leeroy   Telephone number: [phone]  Type of interpretation: Group, spoken; number of participants: 3     Objective   Vitals: /77, HR 75, T 97.7, O2 98%, wt 156.2 lb, Ht 151.5 cm  General: Pleasant. Older woman. No distress.  Knees: Normal to inspection.  No effusion.  Able to bear weight.  Full ROM in extension and flexion.  No joint line tenderness. No ligamentous instability.  Medial joint line tenderness of the right knee.

## 2019-10-22 NOTE — NURSING NOTE
Chief Complaint   Patient presents with     RECHECK     Knee Pain and Flu Shot      Gadiel Grimaldo CMA    Due to patient being non-English speaking/uses sign language, an  was used for this visit. Only for face-to-face interpretation by an external agency, date and length of interpretation can be found on the scanned worksheet.     name: Maribel Acosta  Agency: [employer]  Language: Olga/Leeroy   Telephone number: [phone]  Type of interpretation: Group, spoken; number of participants: 3     Gadiel Grimaldo CMA

## 2020-02-12 ENCOUNTER — TRANSFERRED RECORDS (OUTPATIENT)
Dept: HEALTH INFORMATION MANAGEMENT | Facility: CLINIC | Age: 65
End: 2020-02-12

## 2020-03-18 ENCOUNTER — TELEPHONE (OUTPATIENT)
Dept: FAMILY MEDICINE | Facility: CLINIC | Age: 65
End: 2020-03-18

## 2020-03-18 NOTE — TELEPHONE ENCOUNTER
Called pt regarding her appointment with Dr. Tracy on 3/18/20 at 10:20 am. Spoke to daughter regarding pt's cataract surgery and she stated that the surgery has been canceled. Notified pt's daughter that we will cancel today's pre op and to call back to schedule pre op when they have a new date for the pre op. Pt's daughter understood.     Due to patient being non-English speaking/uses sign language, an  was used for this visit. Only for face-to-face interpretation by an external agency, date and length of interpretation can be found on the scanned worksheet.     name: Kaley ID: [id number]  Agency: [employer] - telephone  Language: Leeroy   Telephone number: [phone]  Type of interpretation: Telephone, spoken   SAPNA Jones

## 2020-04-23 ENCOUNTER — TELEPHONE (OUTPATIENT)
Dept: FAMILY MEDICINE | Facility: CLINIC | Age: 65
End: 2020-04-23

## 2020-04-23 NOTE — LETTER
April 23, 2020      Priscila MELÉNDEZ Watkins  [address] [address]        Dear Priscila,      We tried reaching you by phone but were unable to connect with you. We are reaching out to see how you are doing. This is a very stressful time in the world, which can cause an increase in personal stress and anxiety.     Our clinic is open.  We are here for you and are ready to meet all of your healthcare needs.  We have delayed preventive care until July.  We want everyone who can to stay home during this time for their health and the health of all.  We are now having most visits over the phone, but will see people in person if your doctor agrees that it is necessary.  We also will have video visits starting on April 6, 2020.      Call us with any questions or concerns you may have, and know that we are all in this together.       Sincerely,    Lainey Tracy MD

## 2020-05-22 ENCOUNTER — OFFICE VISIT (OUTPATIENT)
Dept: FAMILY MEDICINE | Facility: CLINIC | Age: 65
End: 2020-05-22
Payer: COMMERCIAL

## 2020-05-22 VITALS
WEIGHT: 152.8 LBS | OXYGEN SATURATION: 97 % | TEMPERATURE: 98.4 F | SYSTOLIC BLOOD PRESSURE: 136 MMHG | BODY MASS INDEX: 30 KG/M2 | HEIGHT: 60 IN | RESPIRATION RATE: 16 BRPM | DIASTOLIC BLOOD PRESSURE: 85 MMHG | HEART RATE: 79 BPM

## 2020-05-22 DIAGNOSIS — E78.5 DYSLIPIDEMIA: ICD-10-CM

## 2020-05-22 DIAGNOSIS — M25.562 CHRONIC PAIN OF BOTH KNEES: ICD-10-CM

## 2020-05-22 DIAGNOSIS — G89.29 CHRONIC PAIN OF BOTH KNEES: ICD-10-CM

## 2020-05-22 DIAGNOSIS — I10 BENIGN ESSENTIAL HYPERTENSION: ICD-10-CM

## 2020-05-22 DIAGNOSIS — M25.561 CHRONIC PAIN OF BOTH KNEES: ICD-10-CM

## 2020-05-22 DIAGNOSIS — Z01.818 PREOP GENERAL PHYSICAL EXAM: Primary | ICD-10-CM

## 2020-05-22 RX ORDER — NAPROXEN SODIUM 220 MG
220 TABLET ORAL 2 TIMES DAILY WITH MEALS
Qty: 180 TABLET | Refills: 0 | Status: SHIPPED | OUTPATIENT
Start: 2020-05-22 | End: 2020-07-07

## 2020-05-22 RX ORDER — SIMVASTATIN 20 MG
20 TABLET ORAL AT BEDTIME
Qty: 90 TABLET | Refills: 3 | Status: SHIPPED | OUTPATIENT
Start: 2020-05-22 | End: 2020-06-18

## 2020-05-22 RX ORDER — HYDROCHLOROTHIAZIDE 25 MG/1
25 TABLET ORAL DAILY
Qty: 90 TABLET | Refills: 3 | Status: SHIPPED | OUTPATIENT
Start: 2020-05-22 | End: 2020-06-18

## 2020-05-22 ASSESSMENT — MIFFLIN-ST. JEOR: SCORE: 1156.66

## 2020-05-22 NOTE — PROGRESS NOTES
[facility] [address] [place] [address]  Phone: [phone]  Fax: [phone]    5/22/2020    Adult PRE-OP Evaluation:  Priscila Watkins, 1955 presents for pre-operative evaluation and assessment as requested by Dr. Brady, prior to undergoing surgery/procedure for treatment of cataracts .    Proposed procedure: Cataract extraction  Date of Surgery/ Procedure: 6/1 for L eye and 6/15 for R eye  Hospital/Surgical Facility: [facility]     Primary Physician: Lainey Tracy  Type of Anesthesia Anticipated: to be determined  History of anesthesia complications: NONE  History of  abnormal bleeding: NONE   History of blood transfusions: NO  Patient has a Health Care Directive or Living Will:  NO    Preoperative Questions   1. NO - Do you have a history of heart attack, stroke, stent, bypass or surgery on an artery in the head, neck, heart or legs?  2. NO - Do you ever have any pain or discomfort in your chest?  3. NO - Have you ever had a severe pain across the front of your chest lasting for half an hour or more?  4. NO - Do you have a history of Congestive Heart Failure?  5. YES - Are you troubled by shortness of breath when: walking on the level/ up a slight hill/ at night? When walking for extended prior or up stairs, chronic, PFTs in 9/2019 showed mild restrictive disease, symptoms thought to be secondary to deconditioning from chronic knee pain  6. NO - Does your chest ever sound wheezy or whistling?  7. NO - Do you currently have a cold, bronchitis or other respiratory infection?  8. NO - Have you had a cold, bronchitis or other respiratory infection within the last 2 weeks?  9. NO - Do you usually have a cough?  10. NO - Do you sometimes get pains in the calves of your legs when you walk?  11. NO - Do you or anyone in your family have previous history of blood clots?  12. NO - Do you or does anyone in your family have a serious bleeding problem such as prolonged bleeding following  surgeries or cuts?  13. NO - Have you ever had problems with anemia or been told to take iron pills?  14. NO - Have you had any abnormal blood loss such as black, tarry or bloody stools, or abnormal vaginal bleeding?  15. NO - Have you ever had a blood transfusion?  16. NO - Have you or any of your relatives ever had problems with anesthesia?  17. NO - Do you have sleep apnea, excessive snoring or daytime drowsiness?  18. NO - Do you have any prosthetic heart valves?  19. NO - Do you have prosthetic joints?  20. NO - Is there any chance that you may be pregnant?    Patient Active Problem List   Diagnosis     Essential hypertension, benign     Lumbosacral spondylosis without myelopathy     Health Care Home     Medial epicondylitis     Hearing loss of both ears     Prediabetes     hydrochlorothiazide (HYDRODIURIL) 25 MG tablet, Take 1 tablet (25 mg) by mouth daily  naproxen sodium (ANAPROX) 220 MG tablet, Take 1 tablet (220 mg) by mouth 2 times daily (with meals) As needed for knee pain  order for DME, Equipment being ordered: left elbow brace  simvastatin (ZOCOR) 20 MG tablet, Take 1 tablet (20 mg) by mouth At Bedtime    No current facility-administered medications on file prior to visit.     OTC products: None, except as noted above    Allergies   Allergen Reactions     Nkda [No Known Drug Allergies]      Latex Allergy: NO    Social History     Socioeconomic History     Marital status:      Spouse name: Not on file     Number of children: Not on file     Years of education: Not on file     Highest education level: Not on file   Occupational History     Not on file   Social Needs     Financial resource strain: Not on file     Food insecurity     Worry: Not on file     Inability: Not on file     Transportation needs     Medical: Not on file     Non-medical: Not on file   Tobacco Use     Smoking status: Never Smoker     Smokeless tobacco: Never Used   Substance and Sexual Activity     Alcohol use: No     Drug  "use: Never     Sexual activity: Not Currently   Lifestyle     Physical activity     Days per week: Not on file     Minutes per session: Not on file     Stress: Not on file   Relationships     Social connections     Talks on phone: Not on file     Gets together: Not on file     Attends Judaism service: Not on file     Active member of club or organization: Not on file     Attends meetings of clubs or organizations: Not on file     Relationship status: Not on file     Intimate partner violence     Fear of current or ex partner: Not on file     Emotionally abused: Not on file     Physically abused: Not on file     Forced sexual activity: Not on file   Other Topics Concern     Parent/sibling w/ CABG, MI or angioplasty before 65F 55M? Not Asked   Social History Narrative     Not on file       REVIEW OF SYSTEMS:   Constitutional, HEENT, cardiovascular, pulmonary, gi and gu systems are negative, except as otherwise noted.    EXAM:     Patient Vitals for the past 24 hrs:   BP Temp Temp src Pulse Resp SpO2 Height Weight   05/22/20 0819 136/85 98.4  F (36.9  C) Oral 79 16 97 % 1.511 m (4' 11.5\") 69.3 kg (152 lb 12.8 oz)     Body mass index is 30.35 kg/m .  GENERAL: healthy, alert and no distress  EYES: Eyes grossly normal to inspection, extraocular movements - intact, and PERRL  HENT: ear canals- normal; TMs- normal; Nose- normal; Mouth- no ulcers, no lesions  NECK: no tenderness, no adenopathy, no asymmetry, no masses, no stiffness; thyroid- normal to palpation  RESP: lungs clear to auscultation - no rales, no rhonchi, no wheezes  CV: regular rates and rhythm, normal S1 S2, no S3 or S4 and no murmur, no click or rub -  ABDOMEN: soft, no tenderness, no  hepatosplenomegaly, no masses, normal bowel sounds  MS: extremities- no gross deformities noted, no edema  SKIN: no suspicious lesions, no rashes  NEURO: strength and tone- normal, sensory exam- grossly normal, mentation- intact, speech- normal, reflexes- symmetric  BACK: no " CVA tenderness, no paralumbar tenderness  PSYCH: Alert and oriented times 3; speech- coherent , normal rate and volume; able to articulate logical thoughts  LYMPHATICS: ant. cervical- normal, post. cervical- normal    DIAGNOSTICS:    No labs or EKG required for low risk surgery (cataract, skin procedure, breast biopsy, etc)    RISK ASSESSMENT:   Cardiovascular Risk:  -Patient is able to walk up a flight of stairs without chest pain.  -The patient does not have chest pain with exertion.  -Patient does not have a history of congestive heart failure.    -The patient does not have a history of stroke and does not have a history of valvular disease.    Pulmonary Risk:  -In terms of risk factors for pulmonary complication, the patient is older then 60    Perioperative Complications:  -The patient does not have a history of bleeding or clotting problems in the past.    -The patient has not had complications from surgeries.    -The patient does not have a family history of any anesthesia or surgical complications.      IMPRESSION:   Reason for surgery/procedure: cataracts    The proposed surgical procedure is considered LOW risk.    For above listed surgery and anesthesia:   Patient is at low risk for surgery/procedure and perioperative/procedure complications.    RECOMMENDATIONS:     Labs:  None indicated  Last BMP, A1c was in 9/2019.    Fasting:  Must be NPO for 6 hours preoperatively.    Preop Plan:  --Approval given to proceed with proposed procedure, without further diagnostic evaluation    Medications:  Patient should take their regular medications the morning of surgery unless otherwise instructed.      Vanessa Freire MD PGY2    Please contact our office if there are any further questions or information required about this patient.

## 2020-05-22 NOTE — PROGRESS NOTES
Preceptor Attestation:   Patient seen, evaluated and discussed with the resident. I have verified the content of the note, which accurately reflects my assessment of the patient and the plan of care.   Supervising Physician:  Haroon Leslie MD

## 2020-06-18 DIAGNOSIS — I10 BENIGN ESSENTIAL HYPERTENSION: ICD-10-CM

## 2020-06-18 DIAGNOSIS — E78.5 DYSLIPIDEMIA: ICD-10-CM

## 2020-06-18 RX ORDER — SIMVASTATIN 20 MG
TABLET ORAL
Qty: 90 TABLET | Refills: 3 | Status: SHIPPED | OUTPATIENT
Start: 2020-06-18 | End: 2021-06-28

## 2020-06-18 RX ORDER — HYDROCHLOROTHIAZIDE 25 MG/1
TABLET ORAL
Qty: 90 TABLET | Refills: 3 | Status: SHIPPED | OUTPATIENT
Start: 2020-06-18 | End: 2021-06-16

## 2020-07-07 DIAGNOSIS — G89.29 CHRONIC PAIN OF BOTH KNEES: ICD-10-CM

## 2020-07-07 DIAGNOSIS — M25.562 CHRONIC PAIN OF BOTH KNEES: ICD-10-CM

## 2020-07-07 DIAGNOSIS — M25.561 CHRONIC PAIN OF BOTH KNEES: ICD-10-CM

## 2020-07-07 RX ORDER — NAPROXEN SODIUM 220 MG
220 TABLET ORAL 2 TIMES DAILY WITH MEALS
Qty: 180 TABLET | Refills: 0 | Status: SHIPPED | OUTPATIENT
Start: 2020-07-07 | End: 2021-09-07

## 2020-08-14 ENCOUNTER — TRANSFERRED RECORDS (OUTPATIENT)
Dept: HEALTH INFORMATION MANAGEMENT | Facility: CLINIC | Age: 65
End: 2020-08-14

## 2020-08-18 ENCOUNTER — RECORDS - HEALTHEAST (OUTPATIENT)
Dept: ADMINISTRATIVE | Facility: OTHER | Age: 65
End: 2020-08-18

## 2020-08-28 ENCOUNTER — HOSPITAL ENCOUNTER (OUTPATIENT)
Dept: MRI IMAGING | Facility: HOSPITAL | Age: 65
Discharge: HOME | End: 2020-08-28
Attending: OPHTHALMOLOGY

## 2020-08-28 DIAGNOSIS — H47.012 ISCHEMIC OPTIC NEUROPATHY OF LEFT EYE: ICD-10-CM

## 2020-08-28 LAB
CREAT BLD-MCNC: 0.9 MG/DL (ref 0.6–1.1)
GFR SERPL CREATININE-BSD FRML MDRD: >60 ML/MIN/1.73M2

## 2020-09-10 ENCOUNTER — TRANSFERRED RECORDS (OUTPATIENT)
Dept: HEALTH INFORMATION MANAGEMENT | Facility: CLINIC | Age: 65
End: 2020-09-10

## 2020-09-22 ENCOUNTER — ALLIED HEALTH/NURSE VISIT (OUTPATIENT)
Dept: FAMILY MEDICINE | Facility: CLINIC | Age: 65
End: 2020-09-22
Payer: COMMERCIAL

## 2020-09-22 DIAGNOSIS — Z23 NEED FOR PROPHYLACTIC VACCINATION AND INOCULATION AGAINST INFLUENZA: Primary | ICD-10-CM

## 2020-11-18 ENCOUNTER — TRANSFERRED RECORDS (OUTPATIENT)
Dept: HEALTH INFORMATION MANAGEMENT | Facility: CLINIC | Age: 65
End: 2020-11-18

## 2021-02-19 ENCOUNTER — TELEPHONE (OUTPATIENT)
Dept: FAMILY MEDICINE | Facility: CLINIC | Age: 66
End: 2021-02-19

## 2021-02-19 NOTE — TELEPHONE ENCOUNTER
Called and talked to the patient's daughter. They are okay for the vaccine and scheduled @ 1:30 PM.    CORIN CarbajalA

## 2021-02-20 ENCOUNTER — IMMUNIZATION (OUTPATIENT)
Dept: FAMILY MEDICINE | Facility: CLINIC | Age: 66
End: 2021-02-20
Payer: COMMERCIAL

## 2021-02-20 PROCEDURE — 91300 PR COVID VAC PFIZER DIL RECON 30 MCG/0.3 ML IM: CPT

## 2021-02-20 PROCEDURE — 0001A PR COVID VAC PFIZER DIL RECON 30 MCG/0.3 ML IM: CPT

## 2021-03-13 ENCOUNTER — IMMUNIZATION (OUTPATIENT)
Dept: FAMILY MEDICINE | Facility: CLINIC | Age: 66
End: 2021-03-13
Attending: FAMILY MEDICINE
Payer: COMMERCIAL

## 2021-03-13 PROCEDURE — 91300 PR COVID VAC PFIZER DIL RECON 30 MCG/0.3 ML IM: CPT

## 2021-03-13 PROCEDURE — 0002A PR COVID VAC PFIZER DIL RECON 30 MCG/0.3 ML IM: CPT

## 2021-05-26 ENCOUNTER — TRANSFERRED RECORDS (OUTPATIENT)
Dept: HEALTH INFORMATION MANAGEMENT | Facility: CLINIC | Age: 66
End: 2021-05-26

## 2021-06-02 ENCOUNTER — RECORDS - HEALTHEAST (OUTPATIENT)
Dept: ADMINISTRATIVE | Facility: CLINIC | Age: 66
End: 2021-06-02

## 2021-06-15 DIAGNOSIS — I10 BENIGN ESSENTIAL HYPERTENSION: ICD-10-CM

## 2021-06-16 RX ORDER — HYDROCHLOROTHIAZIDE 25 MG/1
TABLET ORAL
Qty: 90 TABLET | Refills: 0 | Status: SHIPPED | OUTPATIENT
Start: 2021-06-16 | End: 2021-10-27 | Stop reason: ALTCHOICE

## 2021-06-16 NOTE — TELEPHONE ENCOUNTER
Last visit was over a year ago.  Needs to be seen -- in-person because due for labs.  Reason for visit: prediabetes and BP.    /AW

## 2021-06-18 NOTE — TELEPHONE ENCOUNTER
Called and left a message for patient to give us a call back to schedule an appointment with Dr. Tracy.  Thank you.  SAPNA Gardner

## 2021-06-28 DIAGNOSIS — E78.5 DYSLIPIDEMIA: ICD-10-CM

## 2021-06-28 RX ORDER — SIMVASTATIN 20 MG
TABLET ORAL
Qty: 30 TABLET | Refills: 0 | Status: SHIPPED | OUTPATIENT
Start: 2021-06-28 | End: 2021-08-02

## 2021-06-28 NOTE — TELEPHONE ENCOUNTER
Refilled for 1 month.  Needs appointment for more refills.  See last refill note for more details.    /AW

## 2021-07-06 NOTE — TELEPHONE ENCOUNTER
Called and left a message for pt's daughter to call back to the clinic to schedule an future appointment with Dr. Tracy for any further refills. Pt's daughter stated understanding.     SAPNA Jones

## 2021-07-21 ENCOUNTER — RECORDS - HEALTHEAST (OUTPATIENT)
Dept: ADMINISTRATIVE | Facility: CLINIC | Age: 66
End: 2021-07-21

## 2021-08-02 DIAGNOSIS — E78.5 DYSLIPIDEMIA: ICD-10-CM

## 2021-08-02 RX ORDER — SIMVASTATIN 20 MG
TABLET ORAL
Qty: 30 TABLET | Refills: 0 | Status: SHIPPED | OUTPATIENT
Start: 2021-08-02 | End: 2021-09-02

## 2021-08-31 DIAGNOSIS — E78.5 DYSLIPIDEMIA: ICD-10-CM

## 2021-09-02 RX ORDER — SIMVASTATIN 20 MG
TABLET ORAL
Qty: 30 TABLET | Refills: 0 | Status: SHIPPED | OUTPATIENT
Start: 2021-09-02 | End: 2021-10-04

## 2021-09-07 ENCOUNTER — OFFICE VISIT (OUTPATIENT)
Dept: FAMILY MEDICINE | Facility: CLINIC | Age: 66
End: 2021-09-07
Payer: COMMERCIAL

## 2021-09-07 VITALS
RESPIRATION RATE: 16 BRPM | WEIGHT: 153.6 LBS | HEART RATE: 69 BPM | BODY MASS INDEX: 30.5 KG/M2 | TEMPERATURE: 98.1 F | DIASTOLIC BLOOD PRESSURE: 77 MMHG | SYSTOLIC BLOOD PRESSURE: 126 MMHG

## 2021-09-07 DIAGNOSIS — M76.61 ACHILLES TENDINITIS OF RIGHT LOWER EXTREMITY: Primary | ICD-10-CM

## 2021-09-07 DIAGNOSIS — Z23 NEED FOR PROPHYLACTIC VACCINATION AND INOCULATION AGAINST INFLUENZA: ICD-10-CM

## 2021-09-07 PROCEDURE — 99213 OFFICE O/P EST LOW 20 MIN: CPT | Mod: 25 | Performed by: FAMILY MEDICINE

## 2021-09-07 PROCEDURE — 90662 IIV NO PRSV INCREASED AG IM: CPT | Performed by: FAMILY MEDICINE

## 2021-09-07 PROCEDURE — 90471 IMMUNIZATION ADMIN: CPT | Performed by: FAMILY MEDICINE

## 2021-09-07 RX ORDER — NAPROXEN SODIUM 220 MG
220 TABLET ORAL 2 TIMES DAILY WITH MEALS
Qty: 30 TABLET | Refills: 0 | Status: SHIPPED | OUTPATIENT
Start: 2021-09-07 | End: 2021-09-21

## 2021-09-07 NOTE — NURSING NOTE
Due to patient being non-English speaking/uses sign language, an  was used for this visit. Only for face-to-face interpretation by an external agency, date and length of interpretation can be found on the scanned worksheet.    IType of interpretation: Face-to-face, spoken   name: Dion Steel  Language: Leeroy  Agency: [employer]  Phone number: [phone]

## 2021-09-07 NOTE — PROGRESS NOTES
[facility]    Assessment/Plan:    Achilles tendinitis of right lower extremity  No clinical signs of rupture at this point. If not improving could consider US and/or PT  -restart naproxen for next two weeks  - Heel Lift Order    Need for prophylactic vaccination and inoculation against influenza  - FLUZONE HIGH DOSE 65+  [75844]      Mary Grace Albarran MD  PGY3, Family Medicine    I staffed with Dr. Blackwell, who agrees with my assessment and plan.       Priscila Watkins is a 66 year old female with a PMH of   Patient Active Problem List   Diagnosis     Essential hypertension, benign     Lumbosacral spondylosis without myelopathy     Health Care Home     Medial epicondylitis     Hearing loss of both ears     Prediabetes     presenting to clinic today with a chief complaint of:    Patient presents with:  Musculoskeletal Problem: Pain, comes and goes.  Pain level is 10 when in pain.  Knees and Elbows painful also.  Imm/Inj: Flu Shot    Pain is on and off, feels the pain when walking the most. 10/10 at worst. She twisted her ankle two years ago. No more recent injuries. Came to see her physician during the initial fall and didn't need imaging at that. At that time she got pills and ointment that she put on.     The pain has since gotten worse over the past month. No known triggers.         Current Outpatient Medications   Medication Sig Dispense Refill     hydrochlorothiazide (HYDRODIURIL) 25 MG tablet TAKE 1 TABLET(25 MG) BY MOUTH DAILY 90 tablet 0     naproxen sodium (ANAPROX) 220 MG tablet Take 1 tablet (220 mg) by mouth 2 times daily (with meals) for 14 days As needed for knee pain 30 tablet 0     simvastatin (ZOCOR) 20 MG tablet TAKE 1 TABLET(20 MG) BY MOUTH AT BEDTIME 30 tablet 0     order for DME Equipment being ordered: left elbow brace 1 each 0       O: /77   Pulse 69   Temp 98.1  F (36.7  C)   Resp 16   Wt 69.7 kg (153 lb 9.6 oz)   BMI 30.50 kg/m     Gen:  Well nourished and in  no acute distress   HEENT: NCAT  MSK: R ankle: Very mild soft tissue swelling on medial and anterior to Camryn's tendon. No ecchymosis or other swelling. Tendon feels intact. Anterior and posterior drawer normal.  Normal plantar flexion and dorsiflexion. Can stand on tiptoe but with pain.   Psych: Euthymic     This note was created using Dragon dictation system. Typos are not purposeful.       DME (Durable Medical Equipment) Orders and Documentation  Orders Placed This Encounter   Procedures     Heel Lift Order      The patient was assessed and it was determined the patient is in need of the following listed DME Supplies/Equipment. Please complete supporting documentation below to demonstrate medical necessity.      DME All Other Item(s) Documentation    List reason for need and supporting documentation for medical necessity below for each DME item.     1. Achilles tendonopathy symptoms

## 2021-09-07 NOTE — PATIENT INSTRUCTIONS
Patient Education     Understanding Achilles Tendonitis    Achilles tendonitis is an overuse injury. It causes inflammation of the Achilles tendon. This tendon is found on the back of the ankle. It links the calf muscle to the heel bone. It helps you do pushing-off movements like running or standing on your toes.    How to say it  [id number]-[id number]-I-[id number]   What causes Achilles tendonitis?  Achilles tendonitis can happen if you do an activity like running, walking, or jumping too much. This overuse can strain, or pull, the tendon. It may lead to minor tearing of the tendon. An injury to the lower leg or foot can also cause it.   A tight calf can cause it so if you don t warm up before taking part in sports such as basketball, you are more likely to suffer from this condition. You are also more prone to it if you do too much of such an activity too quickly. Proper training and rest can help prevent it.   Symptoms of Achilles tendonitis   The main symptom of Achilles tendonitis is pain. This pain mostly happens when you move the ankle. The tendon may also feel stiff after a period of no activity, such as sleeping. It may also become swollen. You may hear a crackling sound when you move your ankle. Then tendon becomes thick and a bone spur may occur.   Treatment for Achilles tendonitis  Symptoms often get better after starting treatment. A full recovery may take several months. Treatments include:     Rest. You should stop or change the activity that caused the injury. The tendon will then have time to heal.    Cold or heat pack. These help reduce pain and swelling.    Prescription or over-the-counter medicines. These help reduce pain and swelling.    Shoe inserts. These devices can reduce strain on the Achilles tendon when you move. You may then feel less pain.    Stretching and strengthening exercises. Certain exercises can help you regain flexibility and strength in your Achilles tendon.    Surgery. This option  can fix the injured tendon. But you don t often need it unless other treatments don t work.  When to call your healthcare provider   Call your healthcare provider right away if you have any of these:    Fever of 100.4 F (38 C) or higher, or as directed by your provider    Chills    Pain that gets worse    Symptoms that don t get better, or get worse    New symptoms   [place] last reviewed this educational content on 3/1/2020    0762-4369 [employer]. All rights reserved. This information is not intended as a substitute for professional medical care. Always follow your healthcare professional's instructions.

## 2021-09-07 NOTE — PROGRESS NOTES
Preceptor Attestation:    I discussed the patient with the resident and evaluated the patient in person. I have verified the content of the note, which accurately reflects my assessment of the patient and the plan of care.   Supervising Physician:  Kevyn Blackwell MD.

## 2021-09-27 ENCOUNTER — DOCUMENTATION ONLY (OUTPATIENT)
Dept: FAMILY MEDICINE | Facility: CLINIC | Age: 66
End: 2021-09-27

## 2021-09-27 NOTE — PROGRESS NOTES
To be completed in Nursing note:    Please reference list for forms that require a visit for completion.  Please remind patients that providers are given 3-5 business days to complete and return forms.      Form type:EVRGR for transfer tub bench    Date form received: 21    Date form completed by Physician:21    How was form returned to patient (mailed, faxed, or at  for patient to ): fax to [phone]    Date form mailed/faxed/left at  for patient and sent to HIM for [email or web address]      Once form is left for patient, faxed, or mailed PCS will then close the documentation only encounter.

## 2021-09-30 ENCOUNTER — MEDICAL CORRESPONDENCE (OUTPATIENT)
Dept: HEALTH INFORMATION MANAGEMENT | Facility: CLINIC | Age: 66
End: 2021-09-30

## 2021-10-04 DIAGNOSIS — E78.5 DYSLIPIDEMIA: ICD-10-CM

## 2021-10-04 RX ORDER — SIMVASTATIN 20 MG
TABLET ORAL
Qty: 30 TABLET | Refills: 0 | Status: SHIPPED | OUTPATIENT
Start: 2021-10-04 | End: 2021-11-04

## 2021-10-13 ENCOUNTER — OFFICE VISIT (OUTPATIENT)
Dept: FAMILY MEDICINE | Facility: CLINIC | Age: 66
End: 2021-10-13
Payer: COMMERCIAL

## 2021-10-13 VITALS
DIASTOLIC BLOOD PRESSURE: 83 MMHG | BODY MASS INDEX: 30.07 KG/M2 | RESPIRATION RATE: 18 BRPM | OXYGEN SATURATION: 99 % | HEART RATE: 75 BPM | SYSTOLIC BLOOD PRESSURE: 130 MMHG | WEIGHT: 151.4 LBS | TEMPERATURE: 98.3 F

## 2021-10-13 DIAGNOSIS — M76.61 ACHILLES TENDINITIS OF RIGHT LOWER EXTREMITY: ICD-10-CM

## 2021-10-13 DIAGNOSIS — Z23 NEED FOR VACCINATION: ICD-10-CM

## 2021-10-13 DIAGNOSIS — R42 DIZZINESS: ICD-10-CM

## 2021-10-13 DIAGNOSIS — I10 BENIGN ESSENTIAL HYPERTENSION: Primary | ICD-10-CM

## 2021-10-13 LAB
ANION GAP SERPL CALCULATED.3IONS-SCNC: 11 MMOL/L (ref 5–18)
BUN SERPL-MCNC: 9 MG/DL (ref 8–22)
CALCIUM SERPL-MCNC: 10 MG/DL (ref 8.5–10.5)
CHLORIDE BLD-SCNC: 103 MMOL/L (ref 98–107)
CHOLEST SERPL-MCNC: 211 MG/DL
CO2 SERPL-SCNC: 26 MMOL/L (ref 22–31)
CREAT SERPL-MCNC: 0.78 MG/DL (ref 0.6–1.1)
ERYTHROCYTE [DISTWIDTH] IN BLOOD BY AUTOMATED COUNT: 13.8 % (ref 10–15)
FASTING STATUS PATIENT QL REPORTED: ABNORMAL
GFR SERPL CREATININE-BSD FRML MDRD: 79 ML/MIN/1.73M2
GLUCOSE BLD-MCNC: 95 MG/DL (ref 70–125)
HBA1C MFR BLD: 5.7 % (ref 0–5.6)
HCT VFR BLD AUTO: 37.4 % (ref 35–47)
HDLC SERPL-MCNC: 49 MG/DL
HGB BLD-MCNC: 12.5 G/DL (ref 11.7–15.7)
LDLC SERPL CALC-MCNC: 101 MG/DL
MCH RBC QN AUTO: 25.2 PG (ref 26.5–33)
MCHC RBC AUTO-ENTMCNC: 33.4 G/DL (ref 31.5–36.5)
MCV RBC AUTO: 75 FL (ref 78–100)
PLATELET # BLD AUTO: 333 10E3/UL (ref 150–450)
POTASSIUM BLD-SCNC: 3.5 MMOL/L (ref 3.5–5)
RBC # BLD AUTO: 4.97 10E6/UL (ref 3.8–5.2)
SODIUM SERPL-SCNC: 140 MMOL/L (ref 136–145)
TRIGL SERPL-MCNC: 303 MG/DL
WBC # BLD AUTO: 8 10E3/UL (ref 4–11)

## 2021-10-13 PROCEDURE — 90471 IMMUNIZATION ADMIN: CPT | Performed by: STUDENT IN AN ORGANIZED HEALTH CARE EDUCATION/TRAINING PROGRAM

## 2021-10-13 PROCEDURE — 83036 HEMOGLOBIN GLYCOSYLATED A1C: CPT | Performed by: STUDENT IN AN ORGANIZED HEALTH CARE EDUCATION/TRAINING PROGRAM

## 2021-10-13 PROCEDURE — 90715 TDAP VACCINE 7 YRS/> IM: CPT | Performed by: STUDENT IN AN ORGANIZED HEALTH CARE EDUCATION/TRAINING PROGRAM

## 2021-10-13 PROCEDURE — 80048 BASIC METABOLIC PNL TOTAL CA: CPT | Performed by: STUDENT IN AN ORGANIZED HEALTH CARE EDUCATION/TRAINING PROGRAM

## 2021-10-13 PROCEDURE — 80061 LIPID PANEL: CPT | Performed by: STUDENT IN AN ORGANIZED HEALTH CARE EDUCATION/TRAINING PROGRAM

## 2021-10-13 PROCEDURE — 85027 COMPLETE CBC AUTOMATED: CPT | Performed by: STUDENT IN AN ORGANIZED HEALTH CARE EDUCATION/TRAINING PROGRAM

## 2021-10-13 PROCEDURE — 36415 COLL VENOUS BLD VENIPUNCTURE: CPT | Performed by: STUDENT IN AN ORGANIZED HEALTH CARE EDUCATION/TRAINING PROGRAM

## 2021-10-13 PROCEDURE — 99214 OFFICE O/P EST MOD 30 MIN: CPT | Mod: 25 | Performed by: STUDENT IN AN ORGANIZED HEALTH CARE EDUCATION/TRAINING PROGRAM

## 2021-10-13 RX ORDER — IBUPROFEN 400 MG/1
400 TABLET, FILM COATED ORAL EVERY 8 HOURS PRN
Qty: 30 TABLET | Refills: 0 | Status: SHIPPED | OUTPATIENT
Start: 2021-10-13 | End: 2022-09-02

## 2021-10-13 RX ORDER — ACETAMINOPHEN 325 MG/1
325-650 TABLET ORAL EVERY 6 HOURS PRN
Qty: 90 TABLET | Refills: 1 | Status: SHIPPED | OUTPATIENT
Start: 2021-10-13 | End: 2021-12-13

## 2021-10-13 NOTE — PROGRESS NOTES
[facility] Visit    Assessment & Plan   1. Achilles tendinitis of right lower extremity  We discussed using the ibuprofen very sparingly, but using tylenol and diclofenac gel in place of the ibuprofen if possible. Also discussed eccentric exercises / stretching.  - acetaminophen (TYLENOL) 325 MG tablet; Take 1-2 tablets (325-650 mg) by mouth every 6 hours as needed for mild pain  Dispense: 90 tablet; Refill: 1  - diclofenac (VOLTAREN) 1 % topical gel; Apply 2 g topically 4 times daily  Dispense: 350 g; Refill: 3  - ibuprofen (ADVIL/MOTRIN) 400 MG tablet; Take 1 tablet (400 mg) by mouth every 8 hours as needed for moderate pain  Dispense: 30 tablet; Refill: 0    2. Benign essential hypertension  3. Dizziness  This sounds like orthostatic hypotension, given different readings on her cuff here and our BP reading. I'm hesitant to add or switch any BP medication today. Would consider doing ACE/ARB or amlodipine in place of hydrochlorothiazide if not staying very hydrated (urinates often and drinks often). Will do some basic labs, give a new cuff here and have her check them for the next week and follow up. If everything looks okay, would favor switching medication.  - Basic metabolic panel; Future  - Hemoglobin A1c; Future  - CBC with platelets; Future  - Lipid panel reflex to direct LDL Non-fasting; Future      Options for treatment and follow-up care were reviewed with the patient who was engaged and actively involved in the decision making process, verbalized understanding of the options discussed, and satisfied with the final plan.    Patient was staffed with supervising physician, Dr. Dela Cruz.     Licha Francisco MD, PGY3  [facility]    Subjective   Priscila Watkins is a 66 year old female with a history including prediabetes, HTN, who presents for BP check.    1. HTN / Dizziness  She takes hydrochlorothiazide 25mg daily. Noticed her blood pressure is going up in the afternoon to 130s  - 140s. She is having some dizziness and notices it is when the blood pressure goes up. She occasionally gets dizzy from sitting to standing.     No dizziness with head movements. No chest pain or problems. No headaches. No new blurry vision. She denies new shortness of breath (gets short of breat when BP is high). No abdominal pain. No black or bloody stools. She drinks lots of water and urinates often.     She has her wrist blood pressure cuff today. It is reading as 146/86.      2. She would like a refill of naproxen for her ankle. Dx previously with achilles tendinitis. She bought inserts from her shoes and using home remedy cream for pain. She has 10/10 pain without the naproxen. When she has the medicine she has 0/10 pain. She has not tried tylenol. She uses the naproxen sparingly when she has 10/10 pain.      Patient Active Problem List    Diagnosis Date Noted     Prediabetes 10/13/2017     Priority: Medium     Lab Results   Component Value Date    A1C 5.5 08/08/2018    A1C 5.9 06/30/2016            Hearing loss of both ears 05/29/2014     Priority: Medium     Bilateral profound hearing loss.  ENT note May 2014.       Essential hypertension, benign 12/03/2012     Priority: Medium     Lumbosacral spondylosis without myelopathy 12/03/2012     Priority: Medium     Health Care Home 12/03/2012     Priority: Medium     Tier Level: 1  DX V65.8 REPLACED WITH 73115 HEALTH CARE HOME (04/08/2013)       Medial epicondylitis 12/03/2012     Priority: Medium       Social: She reports that she has never smoked. She has never used smokeless tobacco. She reports that she does not drink alcohol and does not use drugs.    There are no exam notes on file for this visit.    Objective   There were no vitals filed for this visit.  There is no height or weight on file to calculate BMI.    GEN: NAD, healthy, alert  Constitutional: Awake, alert, cooperative, no acute distress, and appears stated age.  HEENT: NC/AT, EOMI, TMs normal  bilaterally, normal conjunctivae/sclerae, mask on  Lungs: No increased WOB. CTABL, no crackles or wheezing appreciated.  Cardiovascular: Appears well perfused. RRR, normal S1 and S2, no S3 or S4, and no murmur appreciated.  Abdomen: Normal BS, soft, non-distended, non-tender, no masses palpated  Musculoskeletal: No redness, warmth, or swelling of the joints. Tone is normal.  Neurologic: A&Ox3.  Cranial nerves II-XII are grossly intact.  Sensory is intact and gait is normal.  Neuropsychiatric: Normal affect, mood, orientation, memory and insight.  Skin: No visible rashes, erythema, pallor, petechia or purpura.

## 2021-10-13 NOTE — PROGRESS NOTES
Preceptor Attestation:    I discussed the patient with the resident and evaluated the patient in person. I have verified the content of the note, which accurately reflects my assessment of the patient and the plan of care.   Supervising Physician:  Herberth Dela Cruz DO.

## 2021-10-18 ENCOUNTER — TELEPHONE (OUTPATIENT)
Dept: FAMILY MEDICINE | Facility: CLINIC | Age: 66
End: 2021-10-18

## 2021-10-20 NOTE — TELEPHONE ENCOUNTER
Attempted to call, someone answer the call and hand off the phone, I will try again later.  Kimberly Best, A    Due to patient being non-English speaking/uses sign language, an  was used for this visit. Only for face-to-face interpretation by an external agency, date and length of interpretation can be found on the scanned worksheet.     name:           ID:  Agency: [facility] [employer] Phone Interpreting  Language: Leeroy   Telephone number: [phone]  Type of interpretation: Telephone, spoken

## 2021-10-21 NOTE — TELEPHONE ENCOUNTER
Called and talked to the daughter. Scheduled patient on 10/27/2021 @ 8:00 AM with Dr. Francisco and no needed to request for . The daughter will be here with the patient.  SAPNA Carbajal

## 2021-10-25 NOTE — PROGRESS NOTES
[facility] Clinic Visit    Assessment & Plan   1. Dizziness  Resolved. No concerns today.    2. Benign essential hypertension  Will discontinue hydrochlorothiazide secondary to frequent urination, start low dose ACE.   - lisinopril (ZESTRIL) 10 MG tablet; Take 1 tablet (10 mg) by mouth daily  Dispense: 30 tablet; Refill: 0    3. Difficulty sleeping  Likely related to frequent urination, but open to supplement today. Also discussed limiting water right before bed.   - melatonin 3 MG tablet; Take 1 tablet (3 mg) by mouth nightly as needed for sleep  Dispense: 60 tablet; Refill: 0        Options for treatment and follow-up care were reviewed with the patient who was engaged and actively involved in the decision making process, verbalized understanding of the options discussed, and satisfied with the final plan.    Patient was staffed with supervising physician, Dr. Dougherty.     Licha Francisco MD, PGY3  [facility] Medicine    Subjective   Priscila Watkins is a 66 year old female with a history including HTN, prediabetes, hearing loss bilaterally, who presents for results follow up.    Seen by me on 10/13/21 for blood pressure follow up and dizziness.     Discussed all results with patient today. She has had no more episodes of dizziness. Totally resolved. She is checking her blood pressure twice daily at home and it ranges from 110s-120s in the morning and evening. Currently taking hydrochlorothiazide 25mg daily in the morning. She urinates every half hour almost but attributes this to drinking lots of water throughout the day.    We had previously discussed changing her blood pressure medication, but she likes urinating this much because she was told by her previous doctor to drink more fluids. The one thing that bothers her is getting up at night to urinate because she has a hard time going back to sleep.       Patient Active Problem List    Diagnosis Date Noted     Prediabetes 10/13/2017     Priority:  Medium     Lab Results   Component Value Date    A1C 5.5 08/08/2018    A1C 5.9 06/30/2016            Hearing loss of both ears 05/29/2014     Priority: Medium     Bilateral profound hearing loss.  ENT note May 2014.       Essential hypertension, benign 12/03/2012     Priority: Medium     Lumbosacral spondylosis without myelopathy 12/03/2012     Priority: Medium     Health Care Home 12/03/2012     Priority: Medium     Tier Level: 1  DX V65.8 REPLACED WITH 54412 [facility]ALTH CARE HOME (04/08/2013)       Medial epicondylitis 12/03/2012     Priority: Medium       Social: She reports that she has never smoked. She has never used smokeless tobacco. She reports that she does not drink alcohol and does not use drugs.    There are no exam notes on file for this visit.    Objective     Vitals:    10/27/21 0809   BP: 130/78   BP Location: Right arm   Patient Position: Sitting   Cuff Size: Adult Regular   Pulse: 69   Resp: 18   Temp: 98.6  F (37  C)   TempSrc: Oral   SpO2: 99%   Weight: 68.4 kg (150 lb 12.8 oz)     Body mass index is 29.95 kg/m .    GEN: NAD, healthy, alert  Constitutional: Awake, alert, cooperative, no acute distress, and appears stated age.  HEENT: NC/AT, EOMI, normal conjunctivae/sclerae, mask on  Lungs: No increased WOB. CTABL, no crackles or wheezing appreciated.  Cardiovascular: Appears well perfused. RRR, normal S1 and S2, no S3 or S4, and no murmur appreciated.  Abdomen: Normal BS, soft, non-distended, non-tender, no masses palpated  Musculoskeletal: No redness, warmth, or swelling of the joints. Tone is normal.  Neurologic: A&Ox3.  Cranial nerves II-XII are grossly intact.  Sensory is intact and gait is normal.  Neuropsychiatric: Normal affect, mood, orientation, memory and insight.  Skin: No visible rashes, erythema, pallor, petechia or purpura.    Labs:  Office Visit on 10/13/2021   Component Date Value Ref Range Status     Cholesterol 10/13/2021 211* <=199 mg/dL Final     Triglycerides 10/13/2021 303*  <=149 mg/dL Final     Direct Measure HDL 10/13/2021 49* >=50 mg/dL Final    HDL Cholesterol Reference Range:     0-2 years:   No reference ranges established for patients under 2 years old  at [facility] for lipid analytes.    2-8 years:  Greater than 45 mg/dL     18 years and older:   Female: Greater than or equal to 50 mg/dL   Male:   Greater than or equal to 40 mg/dL     LDL Cholesterol Calculated 10/13/2021 101  <=129 mg/dL Final     Patient Fasting > 8hrs? 10/13/2021 Unknown   Final     WBC Count 10/13/2021 8.0  4.0 - 11.0 10e3/uL Final     RBC Count 10/13/2021 4.97  3.80 - 5.20 10e6/uL Final     Hemoglobin 10/13/2021 12.5  11.7 - 15.7 g/dL Final     Hematocrit 10/13/2021 37.4  35.0 - 47.0 % Final     MCV 10/13/2021 75* 78 - 100 fL Final     MCH 10/13/2021 25.2* 26.5 - 33.0 pg Final     MCHC 10/13/2021 33.4  31.5 - 36.5 g/dL Final     RDW 10/13/2021 13.8  10.0 - 15.0 % Final     Platelet Count 10/13/2021 333  150 - 450 10e3/uL Final     Hemoglobin A1C 10/13/2021 5.7* 0.0 - 5.6 % Final    Normal <5.7%   Prediabetes 5.7-6.4%    Diabetes 6.5% or higher     Note: Adopted from ADA consensus guidelines.     Sodium 10/13/2021 140  136 - 145 mmol/L Final     Potassium 10/13/2021 3.5  3.5 - 5.0 mmol/L Final     Chloride 10/13/2021 103  98 - 107 mmol/L Final     Carbon Dioxide (CO2) 10/13/2021 26  22 - 31 mmol/L Final     Anion Gap 10/13/2021 11  5 - 18 mmol/L Final     Urea Nitrogen 10/13/2021 9  8 - 22 mg/dL Final     Creatinine 10/13/2021 0.78  0.60 - 1.10 mg/dL Final     Calcium 10/13/2021 10.0  8.5 - 10.5 mg/dL Final     Glucose 10/13/2021 95  70 - 125 mg/dL Final     GFR Estimate 10/13/2021 79  >60 mL/min/1.73m2 Final    As of July 11, 2021, eGFR is calculated by the CKD-EPI creatinine equation, without race adjustment. eGFR can be influenced by muscle mass, exercise, and diet. The reported eGFR is an estimation only and is only applicable if the renal function is stable.

## 2021-10-27 ENCOUNTER — OFFICE VISIT (OUTPATIENT)
Dept: FAMILY MEDICINE | Facility: CLINIC | Age: 66
End: 2021-10-27
Payer: COMMERCIAL

## 2021-10-27 VITALS
RESPIRATION RATE: 18 BRPM | BODY MASS INDEX: 29.95 KG/M2 | DIASTOLIC BLOOD PRESSURE: 78 MMHG | WEIGHT: 150.8 LBS | TEMPERATURE: 98.6 F | SYSTOLIC BLOOD PRESSURE: 130 MMHG | OXYGEN SATURATION: 99 % | HEART RATE: 69 BPM

## 2021-10-27 DIAGNOSIS — I10 BENIGN ESSENTIAL HYPERTENSION: ICD-10-CM

## 2021-10-27 DIAGNOSIS — R42 DIZZINESS: Primary | ICD-10-CM

## 2021-10-27 DIAGNOSIS — G47.9 DIFFICULTY SLEEPING: ICD-10-CM

## 2021-10-27 PROCEDURE — 99214 OFFICE O/P EST MOD 30 MIN: CPT | Mod: GC | Performed by: STUDENT IN AN ORGANIZED HEALTH CARE EDUCATION/TRAINING PROGRAM

## 2021-10-27 RX ORDER — LISINOPRIL 10 MG/1
10 TABLET ORAL DAILY
Qty: 30 TABLET | Refills: 0 | Status: SHIPPED | OUTPATIENT
Start: 2021-10-27 | End: 2021-11-30

## 2021-10-27 RX ORDER — LANOLIN ALCOHOL/MO/W.PET/CERES
3 CREAM (GRAM) TOPICAL
Qty: 60 TABLET | Refills: 0 | Status: SHIPPED | OUTPATIENT
Start: 2021-10-27 | End: 2022-09-02

## 2021-10-27 NOTE — Clinical Note
I changed this to level 4.  You addressed a chronic problem with a complication and changed a med because of it. This meets criteria for level 4.  Also reviewing 3+ labs is level four, this with med change/prescription medication management is level 4. Let me know if you have questions.

## 2021-10-27 NOTE — PROGRESS NOTES
Preceptor Attestation:   Patient seen, evaluated and discussed with the resident. I have verified the content of the note, which accurately reflects my assessment of the patient and the plan of care.   Supervising Physician:  Марина Dougherty MD

## 2021-10-27 NOTE — PATIENT INSTRUCTIONS
1. Stop old blood pressure medication.  2. Start new medication - lisinopril.  3. Try melatonin 1-2 hours before bed.

## 2021-11-03 DIAGNOSIS — E78.5 DYSLIPIDEMIA: ICD-10-CM

## 2021-11-04 RX ORDER — SIMVASTATIN 20 MG
TABLET ORAL
Qty: 30 TABLET | Refills: 0 | Status: SHIPPED | OUTPATIENT
Start: 2021-11-04 | End: 2022-09-02

## 2021-11-30 ENCOUNTER — OFFICE VISIT (OUTPATIENT)
Dept: FAMILY MEDICINE | Facility: CLINIC | Age: 66
End: 2021-11-30
Payer: COMMERCIAL

## 2021-11-30 ENCOUNTER — ANCILLARY PROCEDURE (OUTPATIENT)
Dept: GENERAL RADIOLOGY | Facility: CLINIC | Age: 66
End: 2021-11-30
Attending: FAMILY MEDICINE
Payer: COMMERCIAL

## 2021-11-30 VITALS
RESPIRATION RATE: 18 BRPM | TEMPERATURE: 99.3 F | SYSTOLIC BLOOD PRESSURE: 169 MMHG | HEART RATE: 87 BPM | DIASTOLIC BLOOD PRESSURE: 73 MMHG | OXYGEN SATURATION: 98 %

## 2021-11-30 DIAGNOSIS — Z11.52 ENCOUNTER FOR SCREENING FOR COVID-19: Primary | ICD-10-CM

## 2021-11-30 DIAGNOSIS — Z11.52 ENCOUNTER FOR SCREENING FOR COVID-19: ICD-10-CM

## 2021-11-30 DIAGNOSIS — I10 BENIGN ESSENTIAL HYPERTENSION: ICD-10-CM

## 2021-11-30 DIAGNOSIS — J20.9 ACUTE BRONCHITIS, UNSPECIFIED ORGANISM: Primary | ICD-10-CM

## 2021-11-30 DIAGNOSIS — R05.9 COUGH: ICD-10-CM

## 2021-11-30 PROCEDURE — 71046 X-RAY EXAM CHEST 2 VIEWS: CPT | Mod: FY | Performed by: RADIOLOGY

## 2021-11-30 PROCEDURE — 99214 OFFICE O/P EST MOD 30 MIN: CPT | Mod: CS | Performed by: STUDENT IN AN ORGANIZED HEALTH CARE EDUCATION/TRAINING PROGRAM

## 2021-11-30 PROCEDURE — U0005 INFEC AGEN DETEC AMPLI PROBE: HCPCS | Performed by: STUDENT IN AN ORGANIZED HEALTH CARE EDUCATION/TRAINING PROGRAM

## 2021-11-30 PROCEDURE — U0003 INFECTIOUS AGENT DETECTION BY NUCLEIC ACID (DNA OR RNA); SEVERE ACUTE RESPIRATORY SYNDROME CORONAVIRUS 2 (SARS-COV-2) (CORONAVIRUS DISEASE [COVID-19]), AMPLIFIED PROBE TECHNIQUE, MAKING USE OF HIGH THROUGHPUT TECHNOLOGIES AS DESCRIBED BY CMS-2020-01-R: HCPCS | Performed by: STUDENT IN AN ORGANIZED HEALTH CARE EDUCATION/TRAINING PROGRAM

## 2021-11-30 RX ORDER — BENZONATATE 100 MG/1
100 CAPSULE ORAL 3 TIMES DAILY PRN
Qty: 45 CAPSULE | Refills: 0 | Status: SHIPPED | OUTPATIENT
Start: 2021-11-30 | End: 2021-12-13

## 2021-11-30 RX ORDER — ACETAMINOPHEN 325 MG/1
325-650 TABLET ORAL EVERY 6 HOURS PRN
Qty: 60 TABLET | Refills: 0 | Status: SHIPPED | OUTPATIENT
Start: 2021-11-30 | End: 2021-12-13

## 2021-11-30 RX ORDER — LISINOPRIL 10 MG/1
10 TABLET ORAL DAILY
Qty: 90 TABLET | Refills: 1 | Status: SHIPPED | OUTPATIENT
Start: 2021-11-30 | End: 2022-01-10

## 2021-11-30 RX ORDER — GUAIFENESIN/DEXTROMETHORPHAN 100-10MG/5
10 SYRUP ORAL EVERY 4 HOURS PRN
Qty: 236 ML | Refills: 3 | Status: SHIPPED | OUTPATIENT
Start: 2021-11-30 | End: 2021-12-13

## 2021-11-30 RX ORDER — AZITHROMYCIN 250 MG/1
TABLET, FILM COATED ORAL
Qty: 6 TABLET | Refills: 0 | Status: SHIPPED | OUTPATIENT
Start: 2021-11-30 | End: 2021-12-05

## 2021-11-30 NOTE — PROGRESS NOTES
Preceptor Attestation:    I discussed the patient with the resident and evaluated the patient in person. I personally reviewed the imaging and agree with the interpretation documented by the resident. I have verified the content of the note, which accurately reflects my assessment of the patient and the plan of care.   Supervising Physician:  Kevyn Blackwell MD.                   .

## 2021-11-30 NOTE — PATIENT INSTRUCTIONS
Plan for today:      I suspect you have a bronchitis episode with your cough with phlegm along with the pain.    Chest XR today    Start the antibiotic for 5 days - return to clinic if no improvement after finishing this therapy.    Take the Tessalon pearls during the day to help with cough. Take the liquid medicine before bed.    Start the lisinopril again. Come back to clinic to recheck blood pressure in two weeks.

## 2021-11-30 NOTE — NURSING NOTE
Due to patient being non-English speaking/uses sign language, an  was used for this visit. Only for face-to-face interpretation by an external agency, date and length of interpretation can be found on the scanned worksheet.       name: Rosalio [id number]  Language: Olga   Agency:  [facility]   Type of interpretation:  TELEPHONE      GEOVANNY Loo  11:31 AM  11/30/2021

## 2021-12-01 LAB — SARS-COV-2 RNA RESP QL NAA+PROBE: NEGATIVE

## 2021-12-02 NOTE — RESULT ENCOUNTER NOTE
Called patient's daughter Adam Medina with results and left VM per patient request. Normal XR and negative COVID-19 test. Continue antibiotic and cough medicine. RTC on Monday if no improvement.  Sabine Sandy MD

## 2021-12-11 NOTE — PROGRESS NOTES
Assessment & Plan     Cough  Was diagnosed with acute bronchitis 2 weeks ago.  X-ray at last visit did not show any consolidation.  Lungs were clear today.  Continues to have a bothersome cough, that is worse at night.  Differential includes postviral cough.  Notably the patient was restarted on lisinopril 2 weeks ago.  Cough secondary lisinopril is also on the differential.  Given that she had some relief from Tessalon Perles and Robitussin, will represcribe those medications.  We will also prescribe Tylenol for the muscle pains secondary to cough.  Follow-up in 2 weeks--if no improvement in the throat tickling cough, consider stopping lisinopril and providing alternative blood pressure medication.    - benzonatate (TESSALON) 100 MG capsule; Take 1 capsule (100 mg) by mouth 3 times daily as needed for cough  - guaiFENesin-dextromethorphan (ROBITUSSIN DM) 100-10 MG/5ML syrup; Take 10 mLs by mouth every 4 hours as needed for cough  - acetaminophen (TYLENOL) 325 MG tablet; Take 1-2 tablets (325-650 mg) by mouth every 6 hours as needed for mild pain    Encounter for monitoring ACE-inhibitor therapy  Will check kidney function given patient was started on ACE inhibitor 2 weeks ago.  - Basic metabolic panel     Patient Instructions   1. Cough syrup - 10 mLs as needed every 4 hours for cough    2. Tessalon - 1 pill by mouth 3 times a day as needed     3. Follow up in 2 weeks to see if cough is improved. If you are continuing to having itchiness in your throat, we will consider changing your blood pressure medications.       Return in about 2 weeks (around 12/27/2021) for follow up cough and blood pressure.    Zuri Baker MD  [facility] JAMIE Francois is a 66 year old who presents for the following health issues with family    HPI     Was diagnosed with acute bronchitis 2 weeks ago. Was prescribed Z-clarisa and symptomatic medications (tessalon and cough syrup). This was helpful for  her symptoms. She continues to have a tickle in her throat that causes a cough that is most bothersome at night. She has back pain and chest pain that are related to the coughing.  Denies fevers and chills.    She was restarted on lisinopril 10 mg on 11/30/21 for her blood pressure.     Review of Systems   Constitutional, HEENT, cardiovascular, pulmonary, gi and gu systems are negative, except as otherwise noted.      Objective    /78 (BP Location: Right arm, Patient Position: Sitting, Cuff Size: Adult Regular)   Pulse 75   Temp 98.5  F (36.9  C) (Oral)   Resp 18   Wt 65.8 kg (145 lb)   SpO2 98%   BMI 28.80 kg/m    Body mass index is 28.8 kg/m .  Physical Exam   GENERAL: alert and no distress  NECK: no adenopathy, no asymmetry, masses  Throat: Clear, no exudate  RESP: lungs clear to auscultation - no rales, rhonchi or wheezes  CV: regular rate and rhythm, normal S1 S2, no S3 or S4, no murmur, click or rub, no peripheral edema and peripheral pulses strong  BACK: Tenderness to palpation of trapezius muscle  MS: no gross musculoskeletal defects noted, no edema      ----- Service Performed and Documented by Resident or Fellow ------

## 2021-12-13 ENCOUNTER — OFFICE VISIT (OUTPATIENT)
Dept: FAMILY MEDICINE | Facility: CLINIC | Age: 66
End: 2021-12-13
Payer: COMMERCIAL

## 2021-12-13 VITALS
BODY MASS INDEX: 28.8 KG/M2 | RESPIRATION RATE: 18 BRPM | SYSTOLIC BLOOD PRESSURE: 127 MMHG | OXYGEN SATURATION: 98 % | WEIGHT: 145 LBS | TEMPERATURE: 98.5 F | HEART RATE: 75 BPM | DIASTOLIC BLOOD PRESSURE: 78 MMHG

## 2021-12-13 DIAGNOSIS — Z79.899 ENCOUNTER FOR MONITORING ACE-INHIBITOR THERAPY: Primary | ICD-10-CM

## 2021-12-13 DIAGNOSIS — J20.9 ACUTE BRONCHITIS, UNSPECIFIED ORGANISM: ICD-10-CM

## 2021-12-13 DIAGNOSIS — R05.9 COUGH: ICD-10-CM

## 2021-12-13 DIAGNOSIS — Z51.81 ENCOUNTER FOR MONITORING ACE-INHIBITOR THERAPY: Primary | ICD-10-CM

## 2021-12-13 LAB
ANION GAP SERPL CALCULATED.3IONS-SCNC: 13 MMOL/L (ref 5–18)
BUN SERPL-MCNC: 9 MG/DL (ref 8–22)
CALCIUM SERPL-MCNC: 9.9 MG/DL (ref 8.5–10.5)
CHLORIDE BLD-SCNC: 105 MMOL/L (ref 98–107)
CO2 SERPL-SCNC: 21 MMOL/L (ref 22–31)
CREAT SERPL-MCNC: 0.76 MG/DL (ref 0.6–1.1)
GFR SERPL CREATININE-BSD FRML MDRD: 82 ML/MIN/1.73M2
GLUCOSE BLD-MCNC: 99 MG/DL (ref 70–125)
POTASSIUM BLD-SCNC: 3.9 MMOL/L (ref 3.5–5)
SODIUM SERPL-SCNC: 139 MMOL/L (ref 136–145)

## 2021-12-13 PROCEDURE — 36415 COLL VENOUS BLD VENIPUNCTURE: CPT | Performed by: STUDENT IN AN ORGANIZED HEALTH CARE EDUCATION/TRAINING PROGRAM

## 2021-12-13 PROCEDURE — 99213 OFFICE O/P EST LOW 20 MIN: CPT | Mod: GC | Performed by: STUDENT IN AN ORGANIZED HEALTH CARE EDUCATION/TRAINING PROGRAM

## 2021-12-13 PROCEDURE — 80048 BASIC METABOLIC PNL TOTAL CA: CPT | Performed by: STUDENT IN AN ORGANIZED HEALTH CARE EDUCATION/TRAINING PROGRAM

## 2021-12-13 RX ORDER — BENZONATATE 100 MG/1
100 CAPSULE ORAL 3 TIMES DAILY PRN
Qty: 45 CAPSULE | Refills: 0 | Status: SHIPPED | OUTPATIENT
Start: 2021-12-13 | End: 2022-08-05

## 2021-12-13 RX ORDER — ACETAMINOPHEN 325 MG/1
325-650 TABLET ORAL EVERY 6 HOURS PRN
Qty: 60 TABLET | Refills: 0 | Status: SHIPPED | OUTPATIENT
Start: 2021-12-13 | End: 2022-07-13

## 2021-12-13 RX ORDER — GUAIFENESIN/DEXTROMETHORPHAN 100-10MG/5
10 SYRUP ORAL EVERY 4 HOURS PRN
Qty: 473 ML | Refills: 3 | Status: SHIPPED | OUTPATIENT
Start: 2021-12-13 | End: 2021-12-27

## 2021-12-13 NOTE — NURSING NOTE
Due to patient being non-English speaking/uses sign language, an  was used for this visit. Only for face-to-face interpretation by an external agency, date and length of interpretation can be found on the scanned worksheet.     name: Daron  Agency: [id number]    Language: Leeroy   Telephone number: ID: [id number]  Type of interpretation: Telephone, spoken

## 2021-12-13 NOTE — PROGRESS NOTES
Preceptor attestation:  Vital signs reviewed: /78 (BP Location: Right arm, Patient Position: Sitting, Cuff Size: Adult Regular)   Pulse 75   Temp 98.5  F (36.9  C) (Oral)   Resp 18   Wt 65.8 kg (145 lb)   SpO2 98%   BMI 28.80 kg/m      Patient seen, evaluated, and discussed with the resident.  I have verified the content of the note, which accurately reflects my assessment of the patient and the plan of care.    Supervising physician: Lainey Tracy MD  [facility]

## 2021-12-13 NOTE — PATIENT INSTRUCTIONS
1. Cough syrup - 10 mLs as needed every 4 hours for cough    2. Tessalon - 1 pill by mouth 3 times a day as needed     3. Follow up in 2 weeks to see if cough is improved. If you are continuing to having itchiness in your throat, we will consider changing your blood pressure medications.

## 2021-12-14 NOTE — RESULT ENCOUNTER NOTE
Hello November,    Please call the following patient with the results below. Thank you!    Jese Francois Watkins,    I hope you're well. I wanted to communicate with you the results of the tests that we did.     Which is good news! The laboratory results show normal kidney function. If you continue to have a tickle in your throat causing cough in 2 weeks, please schedule a follow up visit to talk about changing your medications.     Thank you!  Zuri Baker MD

## 2021-12-27 ENCOUNTER — OFFICE VISIT (OUTPATIENT)
Dept: FAMILY MEDICINE | Facility: CLINIC | Age: 66
End: 2021-12-27
Payer: COMMERCIAL

## 2021-12-27 VITALS
BODY MASS INDEX: 27.8 KG/M2 | HEART RATE: 79 BPM | TEMPERATURE: 98.7 F | SYSTOLIC BLOOD PRESSURE: 109 MMHG | WEIGHT: 140 LBS | DIASTOLIC BLOOD PRESSURE: 70 MMHG | RESPIRATION RATE: 20 BRPM | OXYGEN SATURATION: 98 %

## 2021-12-27 DIAGNOSIS — R09.82 POST-NASAL DRIP: Primary | ICD-10-CM

## 2021-12-27 DIAGNOSIS — R05.9 COUGH: ICD-10-CM

## 2021-12-27 PROCEDURE — 99214 OFFICE O/P EST MOD 30 MIN: CPT | Mod: GC | Performed by: STUDENT IN AN ORGANIZED HEALTH CARE EDUCATION/TRAINING PROGRAM

## 2021-12-27 RX ORDER — GUAIFENESIN/DEXTROMETHORPHAN 100-10MG/5
10 SYRUP ORAL EVERY 4 HOURS PRN
Qty: 473 ML | Refills: 3 | Status: SHIPPED | OUTPATIENT
Start: 2021-12-27 | End: 2022-01-10

## 2021-12-27 RX ORDER — FLUTICASONE PROPIONATE 50 MCG
2 SPRAY, SUSPENSION (ML) NASAL DAILY
Qty: 15.8 ML | Refills: 1 | Status: SHIPPED | OUTPATIENT
Start: 2021-12-27 | End: 2022-09-02

## 2021-12-27 NOTE — PROGRESS NOTES
Preceptor Attestation:    I discussed the patient with the resident and evaluated the patient in person. I have verified the content of the note, which accurately reflects my assessment of the patient and the plan of care.   Supervising Physician:  Ham Magaña MD.

## 2021-12-27 NOTE — PATIENT INSTRUCTIONS
Plan for today:  - Two sprays in each nostril daily of the new nasal spray  - use the liquid up to 4 times a day, especially before bed  - Keep on your same BP medicine, if no improvement in 2 weeks we will switch this.    - You are due for your annual Medicare Wellness Visit - Schedule this when able with Dr. Tracy or provider of your choice.

## 2021-12-27 NOTE — PROGRESS NOTES
[facility] Clinic Visit    Assessment & Plan     1. Post-nasal drip  2. Cough    At this time etiology of her cough still somewhat unclear.  Certainly could be secondary to ACE inhibitor use, though she has tolerated this in the past and so would surprise me that resuming after a 1 month hiatus would result in ACE inhibitor induced cough.  She does endorse a tickle in the anterior side of her throat which could be consistent with postnasal drip, has never tried a nasal spray today.  She is agreeable to start treatment for this and monitor symptoms, will consider discontinuation of ACE inhibitor and starting losartan if no response to the intranasal corticosteroid.  In addition, should consider H2 blocker because this may be secondary to some reflux.    - fluticasone (FLONASE) 50 MCG/ACT nasal spray; Spray 2 sprays into both nostrils daily  Dispense: 15.8 mL; Refill: 1  - guaiFENesin-dextromethorphan (ROBITUSSIN DM) 100-10 MG/5ML syrup; Take 10 mLs by mouth every 4 hours as needed for cough  Dispense: 473 mL; Refill: 3    RTC in 3 weeks for follow up of cough or sooner if develops new or worsening symptoms.    Options for treatment and follow-up care were reviewed with the patient who was engaged and actively involved in the decision making process, verbalized understanding of the options discussed, and satisfied with the final plan.    Patient was staffed with supervising physician, Dr. Magaña, who agrees with the assessment and plan.    Sabine Sandy MD, PGY3  [facility]    Patient Instructions   Plan for today:  - Two sprays in each nostril daily of the new nasal spray  - use the liquid up to 4 times a day, especially before bed  - Keep on your same BP medicine, if no improvement in 2 weeks we will switch this.    - You are due for your annual Medicare Wellness Visit - Schedule this when able with Dr. Tracy or provider of your choice.            Subjective   Priscila Watkins is a  66 year old female who returns to clinic today to follow up on cough.     Chief Complaints and History of Present Illnesses   Patient presents with     Cough     f/u cough, per daughter is getting worse, unable to sleep at night. She did finish the medication given last time     Her cough has been present for about 1 month, she was seen by me on 11/30/2021 for acute bronchitis episode - prescribed Z pack which alleviated fever and chest congestion symptoms.  However, she had ongoing cough. She was then seen by Dr. Baker on 12/13 for evaluation of dry tickle in throat that caused night time cough. Was also restarted on lisinopril 11/30 which she had been out for about 1 month but prescribed in past.  Medicine evaluation was unclear if her cough was attributed to the ACE inhibitor versus ongoing bronchitis symptoms or another etiology.    Today she describes a dry tickle in the anterior side of her throat.  It is more bothersome at night, she has difficulty sleeping due to the cough.  She does not have any nasal congestion or eye irritation.  She does not have any production of any phlegm or mucus with her cough.  She does not have chest pain or shortness of breath.  She does not have any symptoms of abdominal pain or reflux, though states sometimes her chest and abdomen hurt because she coughs so hard.    Did find the Robitussin helpful for cough alleviation.    Patient Active Problem List    Diagnosis Date Noted     Prediabetes 10/13/2017     Priority: Medium     Lab Results   Component Value Date    A1C 5.5 08/08/2018    A1C 5.9 06/30/2016            Hearing loss of both ears 05/29/2014     Priority: Medium     Bilateral profound hearing loss.  ENT note May 2014.       Essential hypertension, benign 12/03/2012     Priority: Medium     Lumbosacral spondylosis without myelopathy 12/03/2012     Priority: Medium     Health Care Home 12/03/2012     Priority: Medium     Tier Level: 1  DX V65.8 REPLACED WITH [id number] [facility]  CARE HOME (04/08/2013)       Medial epicondylitis 12/03/2012     Priority: Medium       Current Outpatient Medications   Medication Instructions     acetaminophen (TYLENOL) 325-650 mg, Oral, EVERY 6 HOURS PRN     benzonatate (TESSALON) 100 mg, Oral, 3 TIMES DAILY PRN     diclofenac (VOLTAREN) 2 g, Topical, 4 TIMES DAILY     guaiFENesin-dextromethorphan (ROBITUSSIN DM) 100-10 MG/5ML syrup 10 mLs, Oral, EVERY 4 HOURS PRN     ibuprofen (ADVIL/MOTRIN) 400 mg, Oral, EVERY 8 HOURS PRN     lisinopril (ZESTRIL) 10 mg, Oral, DAILY     melatonin 3 mg, Oral, AT BEDTIME PRN     order for DME Equipment being ordered: left elbow brace     simvastatin (ZOCOR) 20 MG tablet TAKE 1 TABLET(20 MG) BY MOUTH AT BEDTIME     Social: She reports that she has never smoked. She has never used smokeless tobacco. She reports that she does not drink alcohol and does not use drugs.    There are no exam notes on file for this visit.    Objective     Vitals:    12/27/21 0836   BP: 109/70   Pulse: 79   Resp: 20   Temp: 98.7  F (37.1  C)   TempSrc: Oral   SpO2: 98%   Weight: 63.5 kg (140 lb)     Body mass index is 27.8 kg/m .    GEN: NAD, healthy, alert  HEENT: NC/AT, EOMI, normal conjunctivae/sclerae, nasal turbines are boggy appearing, clear oropharynx, MMM  RESP: CTAB, no w/r/r  CV: RRR, nl S1/S2, no m/r/g, no peripheral edema  ABD: soft, NT/ND, +BS throughout  MSK: no MSK defects noted, gait is age appropriate w/o ataxia  SKIN: no suspicious lesions or rashes  NEURO: no obvious focal deficits  PSYCH: mentation appears normal, affect normal/bright    Labs:  Office Visit on 12/13/2021   Component Date Value Ref Range Status     Sodium 12/13/2021 139  136 - 145 mmol/L Final     Potassium 12/13/2021 3.9  3.5 - 5.0 mmol/L Final     Chloride 12/13/2021 105  98 - 107 mmol/L Final     Carbon Dioxide (CO2) 12/13/2021 21* 22 - 31 mmol/L Final     Anion Gap 12/13/2021 13  5 - 18 mmol/L Final     Urea Nitrogen 12/13/2021 9  8 - 22 mg/dL Final     Creatinine  12/13/2021 0.76  0.60 - 1.10 mg/dL Final     Calcium 12/13/2021 9.9  8.5 - 10.5 mg/dL Final     Glucose 12/13/2021 99  70 - 125 mg/dL Final     GFR Estimate 12/13/2021 82  >60 mL/min/1.73m2 Final    As of July 11, 2021, eGFR is calculated by the CKD-EPI creatinine equation, without race adjustment. eGFR can be influenced by muscle mass, exercise, and diet. The reported eGFR is an estimation only and is only applicable if the renal function is stable.   Office Visit on 11/30/2021   Component Date Value Ref Range Status     SARS CoV2 PCR 11/30/2021 Negative  Negative, Testing sent to reference lab. Results will be returned via unsolicited result Final    NEGATIVE: SARS-CoV-2 (COVID-19) RNA not detected, presumed negative.

## 2022-01-10 ENCOUNTER — OFFICE VISIT (OUTPATIENT)
Dept: FAMILY MEDICINE | Facility: CLINIC | Age: 67
End: 2022-01-10
Payer: COMMERCIAL

## 2022-01-10 VITALS
RESPIRATION RATE: 16 BRPM | TEMPERATURE: 97.9 F | OXYGEN SATURATION: 99 % | WEIGHT: 146.2 LBS | SYSTOLIC BLOOD PRESSURE: 130 MMHG | HEIGHT: 59 IN | DIASTOLIC BLOOD PRESSURE: 80 MMHG | BODY MASS INDEX: 29.48 KG/M2 | HEART RATE: 78 BPM

## 2022-01-10 DIAGNOSIS — Z23 HIGH PRIORITY FOR 2019-NCOV VACCINE: ICD-10-CM

## 2022-01-10 DIAGNOSIS — Z23 NEED FOR VACCINATION: ICD-10-CM

## 2022-01-10 DIAGNOSIS — R05.9 COUGH: ICD-10-CM

## 2022-01-10 DIAGNOSIS — Z00.00 ENCOUNTER FOR MEDICARE ANNUAL WELLNESS EXAM: Primary | ICD-10-CM

## 2022-01-10 PROCEDURE — 90471 IMMUNIZATION ADMIN: CPT | Performed by: STUDENT IN AN ORGANIZED HEALTH CARE EDUCATION/TRAINING PROGRAM

## 2022-01-10 PROCEDURE — 90732 PPSV23 VACC 2 YRS+ SUBQ/IM: CPT | Performed by: STUDENT IN AN ORGANIZED HEALTH CARE EDUCATION/TRAINING PROGRAM

## 2022-01-10 PROCEDURE — 99397 PER PM REEVAL EST PAT 65+ YR: CPT | Mod: 25 | Performed by: STUDENT IN AN ORGANIZED HEALTH CARE EDUCATION/TRAINING PROGRAM

## 2022-01-10 PROCEDURE — 0054A COVID-19,PF,PFIZER (12+ YRS): CPT | Performed by: STUDENT IN AN ORGANIZED HEALTH CARE EDUCATION/TRAINING PROGRAM

## 2022-01-10 PROCEDURE — 91305 COVID-19,PF,PFIZER (12+ YRS): CPT | Performed by: STUDENT IN AN ORGANIZED HEALTH CARE EDUCATION/TRAINING PROGRAM

## 2022-01-10 RX ORDER — LOSARTAN POTASSIUM 25 MG/1
25 TABLET ORAL DAILY
Qty: 30 TABLET | Refills: 0 | Status: SHIPPED | OUTPATIENT
Start: 2022-01-10 | End: 2022-02-18

## 2022-01-10 RX ORDER — GUAIFENESIN/DEXTROMETHORPHAN 100-10MG/5
10 SYRUP ORAL EVERY 4 HOURS PRN
Qty: 473 ML | Refills: 3 | Status: SHIPPED | OUTPATIENT
Start: 2022-01-10 | End: 2022-09-02

## 2022-01-10 ASSESSMENT — MIFFLIN-ST. JEOR: SCORE: 1111.53

## 2022-01-10 NOTE — PROGRESS NOTES
Preceptor Attestation:    I discussed the patient with the resident and evaluated the patient in person. I have verified the content of the note, which accurately reflects my assessment of the patient and the plan of care.   Supervising Physician:  Emeka Nickerson MD.

## 2022-01-10 NOTE — PATIENT INSTRUCTIONS
MEDICARE PERSONAL PREVENTIVE SERVICES PLAN - IMMUNIZATIONS     Here are your recommended immunizations.  Take this home for your reference.                                                    IMMUNIZATIONS Description Recommend today?     Influenza (Flu shot) Prevents flu; should get every year No; is up to date.   PCV 13 Pneumonia vaccination; you get it once No: is not indicated today.   PPSV 23 Second pneumonia vaccination; usually get it 1 year after PCV 13 Yes; Recommended and ordered.   Zoster (Shingles) Prevents shingles; you get it once  (Check with Part D insurance for coverage, must receive at a pharmacy, not clinic) No: is not indicated today.   Tetanus Prevents tetanus; once every 10 years No; is up to date.         Personalized Prevention Plan  You are due for the preventive services outlined below.  Your care team is available to assist you in scheduling these services.  If you have already completed any of these items, please share that information with your care team to update in your medical record.  Health Maintenance Due   Topic Date Due     Osteoporosis Screening  Never done     Discuss Advance Care Planning  Never done     Zoster (Shingles) Vaccine (1 of 2) Never done     FALL RISK ASSESSMENT  Never done     Pneumococcal Vaccine (1 of 1 - PPSV23) Never done     Colorectal Cancer Screening  09/24/2020     Mammogram  03/27/2021     COVID-19 Vaccine (3 - Booster for Pfizer series) 09/13/2021

## 2022-01-10 NOTE — PROGRESS NOTES
Medicare Wellness Visit    SUBJECTIVE:                                                                                Priscila Watkins is a 66 year old female who presents for a Welcome to Medicare Visit.    All Histories reviewed and updated in EPIC as appropriate.    Visual Acuity:  Right Eye: 10/10   Left Eye: 10/25  Both Eyes: 10/12.5           FALL RISK ASSESSMENT 1/10/2022   Fallen 2 or more times in the past year? No   Any fall with injury in the past year? No              Health Risk Assessment / Review of Systems      Constitutional: Any fevers or night sweats? No      Eyes:  Vision problems   No      Hearing Do you feel you have hearing loss?   YES R hearing impaired      Cardiovascular: Any chest pain, fast or irregular heart beat, calf pain with walking?     No            Respiratory:   Any breathing problems or cough?    YES COUGH. Patient notes dry cough starting after beginning lisinopril. Due to development of cough, she transitioned back to hydrochlorothiazide temporarily with improvement in this cough. She denies sick contacts, and otherwise feels as though this cough is improving.      Gastrointestinal: Any stomach or stool problems?   No       Genitourinary: Do you have difficulty controlling urination?   No      Muscles and Joints: Any joint stiffness or soreness?   No      Skin: Any concerning lesions or moles?   No      Nervous System: Any loss of strength or feeling, numbness or tingling, shaking, dizziness, or headache?  No      Mental Health: Any depression, anxiety or problems sleeping?    No      Cognition: Do you have any problems with your memory?  No      PHQ-2 Score:   PHQ-2 ( 1999 Pfizer) 1/10/2022 12/27/2021   Q1: Little interest or pleasure in doing things 0 0   Q2: Feeling down, depressed or hopeless 0 0   PHQ-2 Score 0 0   PHQ-2 Total Score (12-17 Years)- Positive if 3 or more points; Administer PHQ-A if positive - -         PHQ-9 Score:   No flowsheet data found.          Medical Care       What other specialists or organizations are involved in your medical care?  NA            Patient Care Team        Relationship Specialty Notifications Start End     Lainey Tracy MD PCP - General Family Practice   7/23/13       Phone: [phone] Fax: [phone] [address] [place] [address]     Lainey Tracy MD Assigned PCP     10/10/21       Phone: [phone] Fax: [phone] [address] [place] [address]             The following health maintenance items are reviewed in Epic and correct as of today:  Health Maintenance   Topic Date Due     DEXA  Never done     ADVANCE CARE PLANNING  Never done     ZOSTER IMMUNIZATION (1 of 2) Never done     MEDICARE ANNUAL WELLNESS VISIT  08/12/2020     FALL RISK ASSESSMENT  Never done     Pneumococcal Vaccine: 65+ Years (1 of 1 - PPSV23) Never done     COLORECTAL CANCER SCREENING  09/24/2020     MAMMO SCREENING  03/27/2021     COVID-19 Vaccine (3 - Booster for Pfizer series) 09/13/2021     A1C  10/13/2022     LIPID  10/13/2026     DTAP/TDAP/TD IMMUNIZATION (5 - Td or Tdap) 10/13/2031     HEPATITIS C SCREENING  Completed     PHQ-2  Completed     INFLUENZA VACCINE  Completed     IPV IMMUNIZATION  Aged Out     MENINGITIS IMMUNIZATION  Aged Out     HEPATITIS B IMMUNIZATION  Aged Out               Social History / Home Safety      Social History           Tobacco Use     Smoking status: Never Smoker     Smokeless tobacco: Never Used   Substance Use Topics     Alcohol use: No      Marital Status:  Who lives in your household? Kids and Grandchildren     Does your home have any of the following safety concerns? Loose rugs in the hallway, no grab bars in the bathroom, no handrails on the stairs or have poorly lit areas?  No      Do you feel threatened or controlled by a partner, ex-partner or anyone in your life? No      Has anyone hurt you physically, for example by pushing, hitting, slapping or kicking you   or forcing you to  "have sex? No           Functional Status      Do you need help with dressing yourself, bathing, or walking? YES walking      Do you need help with the phone, transportation, shopping, preparing meals, housework, laundry, medications or managing money? YES transportation        Risk Behaviors and Healthy Habits          History   Smoking Status     Never Smoker   Smokeless Tobacco     Never Used      How many servings of fruits and vegetables do you eat a day? Two times a day     Exercise: 6-7 days/week for an average of 15-30 minutes walking  and biking      Do you frequently drive without a seatbelt? No      Do you use any other drugs? No                                                Do you use alcohol?No       Frailty Assessment                                                                                         1. By yourself and note using aids, do you have difficulty walking up 10 steps without resting?  No  (1 for Yes, 0 for No)     2. By yourself and not using mobility aids, do you have any difficulty walking several hundred yards?  YES  (1 for Yes, 0 for No)     3. Have you lost 10 or more pounds unintentionally in the previous year? No  (If \"Yes\" and >5% weight loss, then score 1.  Score 0, if <5% weight loss or \"No\" weight loss)     4. How much of the times during the past 3 weeks did you feel tired? 3. Some of the time (\"1\" or \"2\" are scored 1, others 0)     5.  A doctor told the patient they had the following illnesses:High blood pressure    (0-4 = score 0, 5-11= score 1)      Do you have a Health Care Directive? No, advance care planning information given to patient to review.  Patient plans to discuss their wishes with loved ones or provider.        ROS:  Complete ROS negative aside noted in HPI        SCREENING FOR PREVENTION and EARLY DETECTION     ECG (if done)not performed    Corrected Visual acuity: L 20/20   R 10/25  Colon CA Screening (>50-75 ) (FITT annually or colonoscopy every 10years):  " "Recommended and patient accepted testing.  Breast CA Screenin-2 years 50-74years:  Recommended and patient accepted testing. and Dexa Scan (>65 yrs) (covered every 2 years):  Recommended and patient accepted testing.    Advanced Directives: Discussed and patient desires to to have further information and discuss with family.    Immunization History   Administered Date(s) Administered     COVID-19,PF,Pfizer (12+ Yrs) 2021, 2021     Flu, Unspecified 09/15/2009, 10/08/2010, 2011     HepA-Adult 2016     HepB 2007, 2008, 2008     HepB, Unspecified 2007, 2008     HepB-Adult 2008     Historical mumps 2007     Influenza (IIV3) PF 2008, 09/15/2009, 10/08/2010, 2011, 09/10/2013     Influenza Vaccine, 6+MO IM (QUADRIVALENT W/PRESERVATIVES) 10/13/2015, 10/12/2016, 10/13/2017, 10/24/2018, 10/22/2019     Influenza, Quad, High Dose, Pf, 65yr+ (Fluzone HD) 2020, 2021     MMR 2008     TD (ADULT, 7+) 2007, 2008     TDAP Vaccine (Boostrix) 2008     Td (Adult), Adsorbed 2007, 2008     Tdap (Adacel,Boostrix) 10/13/2021     Typhoid IM 2016     Reviewed Immunization Record Today  Pneumoccocal Vaccine: Yes  Varicella Vaccine: {SMI YES/NO/DECLINES:487773  TDaP: No      OBJECTIVE:                                                                                    Vitals: /80 (BP Location: Right arm)   Pulse 78   Temp 97.9  F (36.6  C) (Oral)   Resp 16   Ht 1.503 m (4' 11.17\")   Wt 66.3 kg (146 lb 3.2 oz)   SpO2 99%   BMI 29.36 kg/m    BMI= Body mass index is 29.36 kg/m .    EXAM:   GENERAL APPEARANCE: healthy, alert and no distress  EYES: Eyes grossly normal to inspection, PERRL and conjunctivae and sclerae normal  HENT: ear canals and TM's normal, nose and mouth without ulcers or lesions, oropharynx clear and oral mucous membranes moist  NECK: no adenopathy, no asymmetry, masses, or scars and " thyroid normal to palpation  RESP: lungs clear to auscultation - no rales, rhonchi or wheezes  BREAST: patient deferred  CV: regular rate and rhythm, normal S1 S2, no S3 or S4, no murmur, click or rub, no peripheral edema and peripheral pulses strong  ABDOMEN: soft, nontender, no hepatosplenomegaly, no masses and bowel sounds normal  MS: no musculoskeletal defects are noted and gait is age appropriate without ataxia  SKIN: no suspicious lesions or rashes  NEURO: Normal strength and tone, sensory exam grossly normal, mentation intact and speech normal  PSYCH: mentation appears normal and affect normal/bright    ASSESSMENT/PLAN:                                                                  (Z00.00) Encounter for Medicare annual wellness exam  (primary encounter diagnosis)  Comment: placed orders for routine preventative health care.   Plan: Dexa hip/pelvis/spine*, MA SCREENING DIGITAL         BILAT, Fecal         colorectal cancer screen FIT, Pneumococcal         vaccine 23 valent PPSV23  (Pneumovax) [42661]    (R05.9) Cough  Comment: cough is most associated with lisinopril as she has had improvement since stopping this med. Recommended switching to losartan and continuing to monitor pressures at home.   Plan: guaiFENesin-dextromethorphan (ROBITUSSIN DM)         100-10 MG/5ML syrup  losartan (COZAAR) 25 MG tablet    (Z23) High priority for 2019-nCoV vaccine  Plan: COVID-19,PF,PFIZER (12+ Yrs GRAY LABEL)    (Z23) Need for vaccination  Plan: Pneumococcal vaccine 23 valent PPSV23          (Pneumovax) [87589]      End of Life Planning:   Patient currently has an advanced directive: No.  I have verified the patient's ablity to prepare an advanced directive/make health care decisions.  Literature was provided to assist patient in preparing an advanced directive.    COUNSELING:  Reviewed preventive health counseling, as reflected in patient instructions       Regular exercise       Healthy diet/nutrition       Fall risk  "prevention       Osteoporosis prevention/bone health       Colon cancer screening  Estimated body mass index is 29.36 kg/m  as calculated from the following:    Height as of this encounter: 1.503 m (4' 11.17\").    Weight as of this encounter: 66.3 kg (146 lb 3.2 oz).  Weight management plan: Discussed healthy diet and exercise guidelines   reports that she has never smoked. She has never used smokeless tobacco.      Appropriate preventive services were discussed with this patient, including applicable screening as appropriate for cardiovascular disease, diabetes, osteopenia/osteoporosis, and glaucoma.  As appropriate for age/gender, discussed screening for colorectal cancer, prostate cancer, breast cancer, and cervical cancer. Checklist reviewing preventive services available has been given to the patient.    Reviewed patients plan of care and provided an AVS. The Basic Care Plan (routine screening as documented in Health Maintenance) for Priscila meets the Care Plan requirement. This Care Plan has been established and reviewed with the Patient and daughter.    Viviana Calvo MD  [facility]  Precepted with Dr. Nickerson  "

## 2022-01-10 NOTE — NURSING NOTE
Patient present with daughter today (01/10/2022) and declined an . Pt daughter's interpreted for her. Jerrod, SAPNA

## 2022-01-10 NOTE — NURSING NOTE
Medicare Wellness Visit  Health Risk Assessment        Visual Acuity:  Right Eye: 10/10   Left Eye: 10/25  Both Eyes: 10/12.5        FALL RISK ASSESSMENT 1/10/2022   Fallen 2 or more times in the past year? No   Any fall with injury in the past year? No             Health Risk Assessment / Review of Systems     Constitutional: Any fevers or night sweats? No     Eyes:  Vision problems   No     Hearing Do you feel you have hearing loss?   YES R hearing impaired     Cardiovascular: Any chest pain, fast or irregular heart beat, calf pain with walking?     No           Respiratory:   Any breathing problems or cough?    YES COUGH     Gastrointestinal: Any stomach or stool problems?   No      Genitourinary: Do you have difficulty controlling urination?   No     Muscles and Joints: Any joint stiffness or soreness?   No     Skin: Any concerning lesions or moles?   No     Nervous System: Any loss of strength or feeling, numbness or tingling, shaking, dizziness, or headache?  No     Mental Health: Any depression, anxiety or problems sleeping?    No     Cognition: Do you have any problems with your memory?  No     PHQ-2 Score:   PHQ-2 ( 1999 Pfizer) 1/10/2022 12/27/2021   Q1: Little interest or pleasure in doing things 0 0   Q2: Feeling down, depressed or hopeless 0 0   PHQ-2 Score 0 0   PHQ-2 Total Score (12-17 Years)- Positive if 3 or more points; Administer PHQ-A if positive - -       PHQ-9 Score:   No flowsheet data found.         Medical Care     What other specialists or organizations are involved in your medical care?  NA  Patient Care Team       Relationship Specialty Notifications Start End    Lainey Tracy MD PCP - General Family Practice  7/23/13     Phone: [phone] Fax: [phone] [address] [place] [address]    Lainey Tracy MD Assigned PCP   10/10/21     Phone: [phone] Fax: [phone] [address] [place] [address]                 Social History / Home Safety     Social  "History     Tobacco Use     Smoking status: Never Smoker     Smokeless tobacco: Never Used   Substance Use Topics     Alcohol use: No     Marital Status:  Who lives in your household? Kids and Grandchildren    Does your home have any of the following safety concerns? Loose rugs in the hallway, no grab bars in the bathroom, no handrails on the stairs or have poorly lit areas?  No     Do you feel threatened or controlled by a partner, ex-partner or anyone in your life? No     Has anyone hurt you physically, for example by pushing, hitting, slapping or kicking you   or forcing you to have sex? No          Functional Status     Do you need help with dressing yourself, bathing, or walking? YES walking     Do you need help with the phone, transportation, shopping, preparing meals, housework, laundry, medications or managing money? YES transportation       Risk Behaviors and Healthy Habits     History   Smoking Status     Never Smoker   Smokeless Tobacco     Never Used     How many servings of fruits and vegetables do you eat a day? Two times a day    Exercise: 6-7 days/week for an average of 15-30 minutes walking  and biking     Do you frequently drive without a seatbelt? No     Do you use any other drugs? No         Do you use alcohol?No      Frailty Assessment            1. By yourself and note using aids, do you have difficulty walking up 10 steps without resting?  No  (1 for Yes, 0 for No)    2. By yourself and not using mobility aids, do you have any difficulty walking several hundred yards?  YES  (1 for Yes, 0 for No)    3. Have you lost 10 or more pounds unintentionally in the previous year? No  (If \"Yes\" and >5% weight loss, then score 1.  Score 0, if <5% weight loss or \"No\" weight loss)    4. How much of the times during the past 3 weeks did you feel tired? 3. Some of the time (\"1\" or \"2\" are scored 1, others 0)    5.  A doctor told the patient they had the following illnesses:High blood pressure    (0-4 = " score 0, 5-11= score 1)        DMoo, RMA

## 2022-01-11 PROCEDURE — 82274 ASSAY TEST FOR BLOOD FECAL: CPT | Performed by: STUDENT IN AN ORGANIZED HEALTH CARE EDUCATION/TRAINING PROGRAM

## 2022-01-13 LAB — HEMOCCULT STL QL IA: NEGATIVE

## 2022-02-17 DIAGNOSIS — Z00.00 ENCOUNTER FOR MEDICARE ANNUAL WELLNESS EXAM: ICD-10-CM

## 2022-02-17 PROBLEM — R73.03 PREDIABETES: Status: ACTIVE | Noted: 2017-10-13

## 2022-02-17 NOTE — TELEPHONE ENCOUNTER
[facility] phone call message- patient requesting a refill:    Full Medication Name: Losartan (COZAAR) 25 MG tablet      Pharmacy confirmed as   [id number] - [place], [place] - [address] [place] [address] AT [facility] [address]  [address] [place] [address]  Phone: [phone] Fax: [phone]  : Yes    Additional Comments: Pt's mom called to say that pt is out of medication and the pharmacy needs to her PCP to sign off for refills     OK to leave a message on voice mail? Yes    Primary language: Leeroy      needed? Yes    Call taken on February 17, 2022 at 3:06 PM by Carlos Black

## 2022-02-18 RX ORDER — LOSARTAN POTASSIUM 25 MG/1
25 TABLET ORAL DAILY
Qty: 30 TABLET | Refills: 0 | Status: SHIPPED | OUTPATIENT
Start: 2022-02-18 | End: 2022-03-24

## 2022-03-19 DIAGNOSIS — Z00.00 ENCOUNTER FOR MEDICARE ANNUAL WELLNESS EXAM: ICD-10-CM

## 2022-03-24 RX ORDER — LOSARTAN POTASSIUM 25 MG/1
TABLET ORAL
Qty: 30 TABLET | Refills: 0 | Status: SHIPPED | OUTPATIENT
Start: 2022-03-24 | End: 2022-05-03

## 2022-05-02 DIAGNOSIS — Z00.00 ENCOUNTER FOR MEDICARE ANNUAL WELLNESS EXAM: ICD-10-CM

## 2022-05-03 RX ORDER — LOSARTAN POTASSIUM 25 MG/1
TABLET ORAL
Qty: 30 TABLET | Refills: 0 | Status: SHIPPED | OUTPATIENT
Start: 2022-05-03 | End: 2022-06-07

## 2022-06-04 DIAGNOSIS — Z00.00 ENCOUNTER FOR MEDICARE ANNUAL WELLNESS EXAM: ICD-10-CM

## 2022-06-04 RX ORDER — LOSARTAN POTASSIUM 25 MG/1
TABLET ORAL
Qty: 30 TABLET | Refills: 0 | Status: CANCELLED | OUTPATIENT
Start: 2022-06-04

## 2022-06-07 RX ORDER — LOSARTAN POTASSIUM 25 MG/1
TABLET ORAL
Qty: 30 TABLET | Refills: 0 | Status: SHIPPED | OUTPATIENT
Start: 2022-06-07 | End: 2022-09-02

## 2022-07-13 ENCOUNTER — OFFICE VISIT (OUTPATIENT)
Dept: FAMILY MEDICINE | Facility: CLINIC | Age: 67
End: 2022-07-13
Payer: COMMERCIAL

## 2022-07-13 VITALS
TEMPERATURE: 98.5 F | SYSTOLIC BLOOD PRESSURE: 116 MMHG | OXYGEN SATURATION: 99 % | BODY MASS INDEX: 30 KG/M2 | RESPIRATION RATE: 16 BRPM | HEART RATE: 66 BPM | DIASTOLIC BLOOD PRESSURE: 76 MMHG | WEIGHT: 149.4 LBS

## 2022-07-13 DIAGNOSIS — R73.03 PREDIABETES: ICD-10-CM

## 2022-07-13 DIAGNOSIS — M25.50 MULTIPLE JOINT PAIN: Primary | ICD-10-CM

## 2022-07-13 LAB
ALBUMIN SERPL BCG-MCNC: 4.7 G/DL (ref 3.5–5.2)
ALP SERPL-CCNC: 90 U/L (ref 35–104)
ALT SERPL W P-5'-P-CCNC: 20 U/L (ref 10–35)
ANION GAP SERPL CALCULATED.3IONS-SCNC: 12 MMOL/L (ref 7–15)
AST SERPL W P-5'-P-CCNC: 32 U/L (ref 10–35)
BILIRUB SERPL-MCNC: 0.9 MG/DL
BUN SERPL-MCNC: 11.2 MG/DL (ref 8–23)
CALCIUM SERPL-MCNC: 9.9 MG/DL (ref 8.8–10.2)
CHLORIDE SERPL-SCNC: 102 MMOL/L (ref 98–107)
CREAT SERPL-MCNC: 0.69 MG/DL (ref 0.51–0.95)
CRP SERPL-MCNC: <3 MG/L
DEPRECATED HCO3 PLAS-SCNC: 27 MMOL/L (ref 22–29)
GFR SERPL CREATININE-BSD FRML MDRD: >90 ML/MIN/1.73M2
GLUCOSE SERPL-MCNC: 95 MG/DL (ref 70–99)
HBA1C MFR BLD: 5.7 % (ref 0–5.6)
POTASSIUM SERPL-SCNC: 4.1 MMOL/L (ref 3.4–5.3)
PROT SERPL-MCNC: 8.4 G/DL (ref 6.4–8.3)
SODIUM SERPL-SCNC: 141 MMOL/L (ref 136–145)

## 2022-07-13 PROCEDURE — 86140 C-REACTIVE PROTEIN: CPT | Performed by: STUDENT IN AN ORGANIZED HEALTH CARE EDUCATION/TRAINING PROGRAM

## 2022-07-13 PROCEDURE — 80053 COMPREHEN METABOLIC PANEL: CPT | Performed by: STUDENT IN AN ORGANIZED HEALTH CARE EDUCATION/TRAINING PROGRAM

## 2022-07-13 PROCEDURE — 83036 HEMOGLOBIN GLYCOSYLATED A1C: CPT | Performed by: STUDENT IN AN ORGANIZED HEALTH CARE EDUCATION/TRAINING PROGRAM

## 2022-07-13 PROCEDURE — 36415 COLL VENOUS BLD VENIPUNCTURE: CPT | Performed by: STUDENT IN AN ORGANIZED HEALTH CARE EDUCATION/TRAINING PROGRAM

## 2022-07-13 PROCEDURE — 99213 OFFICE O/P EST LOW 20 MIN: CPT | Mod: GC | Performed by: STUDENT IN AN ORGANIZED HEALTH CARE EDUCATION/TRAINING PROGRAM

## 2022-07-13 RX ORDER — NAPROXEN 250 MG/1
250 TABLET ORAL 2 TIMES DAILY PRN
Qty: 60 TABLET | Refills: 1 | Status: SHIPPED | OUTPATIENT
Start: 2022-07-13 | End: 2024-05-17

## 2022-07-13 RX ORDER — ACETAMINOPHEN 325 MG/1
325-650 TABLET ORAL EVERY 6 HOURS PRN
Qty: 120 TABLET | Refills: 3 | Status: SHIPPED | OUTPATIENT
Start: 2022-07-13 | End: 2024-06-18

## 2022-07-13 NOTE — PATIENT INSTRUCTIONS
Dear Priscila Watkins,    Thank you for coming to see us today!    You were seen today for joint pain and blood pressure      Take Tylenol (acetaminophen) 1-2 tablets every 6 hours as needed for pain   Continue to apply the gel 4 times per day to the painful joints  Take naproxen 2 times daily as needed for pain  Return        As always, please call the clinic if you have any questions or concerns. A doctor or nurse is available 24/7 to answer questions.     Be well,    Dr. Paty Story

## 2022-07-13 NOTE — LETTER
July 14, 2022      Priscila Watkins  [address] [address]        Dear ,    We are writing to inform you of your test results.    The labs collected 07/13/22 were within expected range.  Prediabetes lab, A1c, is stable at 5.7%, same as 9 months ago.  We can discuss this more at our upcoming visit.       Resulted Orders   CRP inflammation   Result Value Ref Range    CRP Inflammation <3.00 <5.00 mg/L   Hemoglobin A1c   Result Value Ref Range    Hemoglobin A1C 5.7 (H) 0.0 - 5.6 %      Comment:      Normal <5.7%   Prediabetes 5.7-6.4%    Diabetes 6.5% or higher     Note: Adopted from ADA consensus guidelines.   Comprehensive metabolic panel   Result Value Ref Range    Sodium 141 136 - 145 mmol/L    Potassium 4.1 3.4 - 5.3 mmol/L    Creatinine 0.69 0.51 - 0.95 mg/dL    Urea Nitrogen 11.2 8.0 - 23.0 mg/dL    Chloride 102 98 - 107 mmol/L    Carbon Dioxide (CO2) 27 22 - 29 mmol/L    Anion Gap 12 7 - 15 mmol/L    Glucose 95 70 - 99 mg/dL    Calcium 9.9 8.8 - 10.2 mg/dL    Protein Total 8.4 (H) 6.4 - 8.3 g/dL    Albumin 4.7 3.5 - 5.2 g/dL    Bilirubin Total 0.9 <=1.2 mg/dL    Alkaline Phosphatase 90 35 - 104 U/L    AST 32 10 - 35 U/L    ALT 20 10 - 35 U/L    GFR Estimate >90 >60 mL/min/1.73m2      Comment:      Effective December 21, 2021 eGFRcr in adults is calculated using the 2021 CKD-EPI creatinine equation which includes age and gender ( et al., NEJM, DOI: 10.1056/AWEYne0300808)       If you have any questions or concerns, please call the clinic at the number listed above.       Sincerely,      Eloy Snell MD

## 2022-07-13 NOTE — NURSING NOTE
Due to patient being non-English speaking/uses sign language, an  was used for this visit. Only for face-to-face interpretation by an external agency, date and length of interpretation can be found on the scanned worksheet.     name: Obinna Aragon  Agency: [employer]  Language: Leeroy   Telephone number: [phone]  Type of interpretation: Face-to-face, spoken

## 2022-07-13 NOTE — PROGRESS NOTES
Assessment & Plan     There are no diagnoses linked to this encounter.    Multiple joint pain  Labs are unrevealing.  Discussed PRN NSAIDs, ice, rest, heat, physical activity.  Cr is 0.69 today.  Likely arthritis.  Encouraged followup if no improvement with routine supportive cares.   - CRP inflammation  - naproxen (NAPROSYN) 250 MG tablet; Take 1 tablet (250 mg) by mouth 2 times daily as needed for moderate pain  - Comprehensive metabolic panel  - acetaminophen (TYLENOL) 325 MG tablet; Take 1-2 tablets (325-650 mg) by mouth every 6 hours as needed for mild pain  - diclofenac (VOLTAREN) 1 % topical gel; Apply 4 g topically 4 times daily    Prediabetes  A1c is stable.  Lipids were checked in October 2021.   - Hemoglobin A1c          Follow Up  4 weeks PRN if not better      Staffed with Dr. Snell.    Paty Story MD  PGY-3  [facility]           ______________________________________________________    Subjective     Chief Complaint   Patient presents with     Pain     All over Joint pain.  Right side is the worst. Not on any medications currently     Recheck Medication     Out of BP medication.  Wants more than 1 month refill         HPI    Joint/Muscle Pain      Onset: 1 month    Description:   Location(s): right ankle, knee, and elbow, low back pain  Character: throbbing    History:   Previous similar pain: Yes Details: was prescribed pain medication in the past, but hasn't had that refilled      Worsened by    Overuse?: Yes Details: worse with walking  Morning Stiffness?:no    Alleviating factors:  Improved by: rest/inactivity, exercises and NSAID   Therapies Tried and outcome:  Has been on medication for joint pains before.  She stopped taking the pain medication for about 6 months because the doctor stopped prescribing.  Doesn't remember the name.  She uses the cream (likely Voltaren) 2 times per day      Problem, Medication and Allergy Lists were      Patient Active Problem List    Diagnosis  Date Noted     Prediabetes 10/13/2017     Priority: Medium     Lab Results   Component Value Date    A1C 5.5 08/08/2018    A1C 5.9 06/30/2016            Hearing loss of both ears 05/29/2014     Priority: Medium     Bilateral profound hearing loss.  ENT note May 2014.       Essential hypertension, benign 12/03/2012     Priority: Medium     Lumbosacral spondylosis without myelopathy 12/03/2012     Priority: Medium     Health Care Home 12/03/2012     Priority: Medium     Tier Level: 1  DX V65.8 REPLACED WITH 05877 HEALTH CARE HOME (04/08/2013)       Medial epicondylitis 12/03/2012     Priority: Medium         Current Outpatient Medications   Medication Sig Dispense Refill     acetaminophen (TYLENOL) 325 MG tablet Take 1-2 tablets (325-650 mg) by mouth every 6 hours as needed for mild pain 120 tablet 3     diclofenac (VOLTAREN) 1 % topical gel Apply 4 g topically 4 times daily 350 g 11     naproxen (NAPROSYN) 250 MG tablet Take 1 tablet (250 mg) by mouth 2 times daily as needed for moderate pain 60 tablet 1     benzonatate (TESSALON) 100 MG capsule Take 1 capsule (100 mg) by mouth 3 times daily as needed for cough 45 capsule 0     fluticasone (FLONASE) 50 MCG/ACT nasal spray Spray 2 sprays into both nostrils daily 15.8 mL 1     guaiFENesin-dextromethorphan (ROBITUSSIN DM) 100-10 MG/5ML syrup Take 10 mLs by mouth every 4 hours as needed for cough 473 mL 3     ibuprofen (ADVIL/MOTRIN) 400 MG tablet Take 1 tablet (400 mg) by mouth every 8 hours as needed for moderate pain 30 tablet 0     losartan (COZAAR) 25 MG tablet TAKE 1 TABLET(25 MG) BY MOUTH DAILY 30 tablet 0     melatonin 3 MG tablet Take 1 tablet (3 mg) by mouth nightly as needed for sleep 60 tablet 0     order for DME Equipment being ordered: left elbow brace 1 each 0     simvastatin (ZOCOR) 20 MG tablet TAKE 1 TABLET(20 MG) BY MOUTH AT BEDTIME 30 tablet 0   .    Review of Systems   Gen:  No fever or weakness.  No changes in appetite.  HEENT:  No headaches.  No  changes in vision or hearing.  No new neck pain or stiffness.   CV:  No dizziness or palpitations.    Resp:  No SOB or wheezing.   GI:  No diarrhea.  No vomiting.   :  No concerns.   Skin:  No new rashes or bruises.  Neuropsych:  Mood has been stable.  No changes in sleep.          Objective     /76   Pulse 66   Temp 98.5  F (36.9  C)   Resp 16   Wt 67.8 kg (149 lb 6.4 oz)   SpO2 99%   BMI 30.00 kg/m      Physical Exam  Gen:  Appears stated age.  Casually groomed.   HEENT:  EOM grossly intact.   CV:  RRR, no murmur  Resp:  Nonlabored respirations.  No wheezing or crackles.   GI:  Normoactive BS.  Nondistended and nontender.   Skin:  No obvious rashes or bruises.  Extr: Trace edema  Neuropsych:  Mood appears normal.  Affect congruent.  No obvious focal neurologic deficits or gait abnormalities.          Results for orders placed or performed in visit on 07/13/22   CRP inflammation     Status: Normal   Result Value Ref Range    CRP Inflammation <3.00 <5.00 mg/L   Hemoglobin A1c     Status: Abnormal   Result Value Ref Range    Hemoglobin A1C 5.7 (H) 0.0 - 5.6 %   Comprehensive metabolic panel     Status: Abnormal   Result Value Ref Range    Sodium 141 136 - 145 mmol/L    Potassium 4.1 3.4 - 5.3 mmol/L    Creatinine 0.69 0.51 - 0.95 mg/dL    Urea Nitrogen 11.2 8.0 - 23.0 mg/dL    Chloride 102 98 - 107 mmol/L    Carbon Dioxide (CO2) 27 22 - 29 mmol/L    Anion Gap 12 7 - 15 mmol/L    Glucose 95 70 - 99 mg/dL    Calcium 9.9 8.8 - 10.2 mg/dL    Protein Total 8.4 (H) 6.4 - 8.3 g/dL    Albumin 4.7 3.5 - 5.2 g/dL    Bilirubin Total 0.9 <=1.2 mg/dL    Alkaline Phosphatase 90 35 - 104 U/L    AST 32 10 - 35 U/L    ALT 20 10 - 35 U/L    GFR Estimate >90 >60 mL/min/1.73m2                ----- Service Performed and Documented by Resident or Fellow ------

## 2022-07-18 NOTE — PROGRESS NOTES
Preceptor Attestation: I discussed the patient with the resident and evaluated the patient in person.  I discussed the patient with the resident and evaluated the patient in person. I have verified the content of the note, which accurately reflects my assessment of the patient and the plan of care.   Supervising Physician:  Eloy Snell MD.

## 2022-08-05 ENCOUNTER — OFFICE VISIT (OUTPATIENT)
Dept: FAMILY MEDICINE | Facility: CLINIC | Age: 67
End: 2022-08-05
Payer: COMMERCIAL

## 2022-08-05 VITALS
OXYGEN SATURATION: 98 % | WEIGHT: 151 LBS | TEMPERATURE: 97.9 F | BODY MASS INDEX: 30.32 KG/M2 | DIASTOLIC BLOOD PRESSURE: 84 MMHG | RESPIRATION RATE: 16 BRPM | SYSTOLIC BLOOD PRESSURE: 143 MMHG | HEART RATE: 72 BPM

## 2022-08-05 DIAGNOSIS — M25.531 RIGHT WRIST PAIN: ICD-10-CM

## 2022-08-05 DIAGNOSIS — M25.571 PAIN IN JOINT INVOLVING ANKLE AND FOOT, RIGHT: Primary | ICD-10-CM

## 2022-08-05 PROCEDURE — 99213 OFFICE O/P EST LOW 20 MIN: CPT | Mod: 25 | Performed by: STUDENT IN AN ORGANIZED HEALTH CARE EDUCATION/TRAINING PROGRAM

## 2022-08-05 PROCEDURE — 91305 COVID-19,PF,PFIZER (12+ YRS): CPT | Performed by: STUDENT IN AN ORGANIZED HEALTH CARE EDUCATION/TRAINING PROGRAM

## 2022-08-05 PROCEDURE — 0054A COVID-19,PF,PFIZER (12+ YRS): CPT | Performed by: STUDENT IN AN ORGANIZED HEALTH CARE EDUCATION/TRAINING PROGRAM

## 2022-08-05 NOTE — PROGRESS NOTES
Assessment & Plan     Pain in joint involving ankle and foot, right  No indication for xray.  I recommend PT.  They would like PT at [facility].  Daughter will call.   - PT referral    Right wrist pain  Continue voltaren, Tylenol, PRN naproxen.  Daughter will call [facility] to follow up with surgeon and to get PT over there.   - PT referral    Elevated blood pressure reading without diagnosis of hypertension  Stopped her antihypertensive last visit, but this may need to be restarted.  Recommend follow-up in 1 to 2 months at which point we will reassess for restarting the blood pressure medicine.      Follow Up  1-2 months after PT and to follow up BP      Staffed with Dr. Krishan Story MD  PGY-3  [facility]           ______________________________________________________    Subjective     Chief Complaint   Patient presents with     other     Follow up from last visit joint pain. Medication has helped but right side still hurts          HPI      Joint/Muscle Pain    Left ankle aches all the time.  The pain is in the back of the ankle.  2 years ago she rolled her ankle as she stepped in to a pot hole. This pain started around that time.  She's used a pain reducing patch and a type of cream to help this in the past.  Tylenol has helped since our last visit.  Voltaren also helps, which she uses 2 times per day.   No morning stiffness in the ankle but some stiffness in the right wrist on which she had carpal tunnel surgery.  She's done PT for the hand in the past and it went well.  She's also broken that wrist.  Now, when she holds something with her right hand it gets stiff and sometimes she drops the object.  Started after the surgery. She feels like the nerves are sticking together (fingers are sticking together).  History of distal radius fracture, seen at [facility] in 2018.  Also has cubital tunnel syndrome on the right.    Patient Active Problem List    Diagnosis Date Noted     Prediabetes  10/13/2017     Priority: Medium     Lab Results   Component Value Date    A1C 5.5 08/08/2018    A1C 5.9 06/30/2016            Hearing loss of both ears 05/29/2014     Priority: Medium     Bilateral profound hearing loss.  ENT note May 2014.       Essential hypertension, benign 12/03/2012     Priority: Medium     Lumbosacral spondylosis without myelopathy 12/03/2012     Priority: Medium     Health Care Home 12/03/2012     Priority: Medium     Tier Level: 1  DX V65.8 REPLACED WITH 72969 HEALTH CARE HOME (04/08/2013)       Medial epicondylitis 12/03/2012     Priority: Medium     Current Outpatient Medications   Medication Sig Dispense Refill     acetaminophen (TYLENOL) 325 MG tablet Take 1-2 tablets (325-650 mg) by mouth every 6 hours as needed for mild pain 120 tablet 3     naproxen (NAPROSYN) 250 MG tablet Take 1 tablet (250 mg) by mouth 2 times daily as needed for moderate pain 60 tablet 1     benzonatate (TESSALON) 100 MG capsule Take 1 capsule (100 mg) by mouth 3 times daily as needed for cough (Patient not taking: Reported on 8/5/2022) 45 capsule 0     diclofenac (VOLTAREN) 1 % topical gel Apply 4 g topically 4 times daily (Patient not taking: Reported on 8/5/2022) 350 g 11     fluticasone (FLONASE) 50 MCG/ACT nasal spray Spray 2 sprays into both nostrils daily (Patient not taking: Reported on 8/5/2022) 15.8 mL 1     guaiFENesin-dextromethorphan (ROBITUSSIN DM) 100-10 MG/5ML syrup Take 10 mLs by mouth every 4 hours as needed for cough (Patient not taking: Reported on 8/5/2022) 473 mL 3     ibuprofen (ADVIL/MOTRIN) 400 MG tablet Take 1 tablet (400 mg) by mouth every 8 hours as needed for moderate pain (Patient not taking: Reported on 8/5/2022) 30 tablet 0     losartan (COZAAR) 25 MG tablet TAKE 1 TABLET(25 MG) BY MOUTH DAILY (Patient not taking: Reported on 8/5/2022) 30 tablet 0     melatonin 3 MG tablet Take 1 tablet (3 mg) by mouth nightly as needed for sleep (Patient not taking: Reported on 8/5/2022) 60 tablet  0     order for DME Equipment being ordered: left elbow brace (Patient not taking: Reported on 8/5/2022) 1 each 0     simvastatin (ZOCOR) 20 MG tablet TAKE 1 TABLET(20 MG) BY MOUTH AT BEDTIME (Patient not taking: Reported on 8/5/2022) 30 tablet 0       Review of Systems   Gen:  No fever or weakness.  No changes in appetite.  HEENT:  No headaches.  No changes in vision or hearing.  No new neck pain or stiffness.   CV:  No dizziness or palpitations.    Resp:  No SOB or wheezing.   GI:  No diarrhea.  No vomiting.   :  No concerns.   Skin:  No new rashes or bruises.  Neuropsych:  Mood has been stable.  No changes in sleep.          Objective     BP (!) 143/84 (BP Location: Left arm, Patient Position: Sitting, Cuff Size: Adult Regular)   Pulse 72   Temp 97.9  F (36.6  C) (Oral)   Resp 16   Wt 68.5 kg (151 lb)   SpO2 98%   BMI 30.32 kg/m      Physical Exam  Gen:  Appears stated age.  Casually groomed.   HEENT:  EOM grossly intact.  Neck: supple, non-tender, free range of motion  Resp:  Nonlabored respirations.    Skin:  No obvious rashes or bruises.  Right wrist:  Scar from prior carpal tunnel release.  Full ROM. No swelling. No weakness of median or ulnar nerve distribution.    Right ankle: No swelling. Full ROM.  Several points of nonbony tenderness in ankle and proximal dorsal foot.  No bony tenderness.  Extr: No edema  Neuropsych:  Mood appears normal.  Affect congruent.  No obvious focal neurologic deficits or gait abnormalities.         Office Visit on 07/13/2022   Component Date Value Ref Range Status     CRP Inflammation 07/13/2022 <3.00  <5.00 mg/L Final     Hemoglobin A1C 07/13/2022 5.7 (A) 0.0 - 5.6 % Final    Normal <5.7%   Prediabetes 5.7-6.4%    Diabetes 6.5% or higher     Note: Adopted from ADA consensus guidelines.     Sodium 07/13/2022 141  136 - 145 mmol/L Final     Potassium 07/13/2022 4.1  3.4 - 5.3 mmol/L Final     Creatinine 07/13/2022 0.69  0.51 - 0.95 mg/dL Final     Urea Nitrogen  07/13/2022 11.2  8.0 - 23.0 mg/dL Final     Chloride 07/13/2022 102  98 - 107 mmol/L Final     Carbon Dioxide (CO2) 07/13/2022 27  22 - 29 mmol/L Final     Anion Gap 07/13/2022 12  7 - 15 mmol/L Final     Glucose 07/13/2022 95  70 - 99 mg/dL Final     Calcium 07/13/2022 9.9  8.8 - 10.2 mg/dL Final     Protein Total 07/13/2022 8.4 (A) 6.4 - 8.3 g/dL Final     Albumin 07/13/2022 4.7  3.5 - 5.2 g/dL Final     Bilirubin Total 07/13/2022 0.9  <=1.2 mg/dL Final     Alkaline Phosphatase 07/13/2022 90  35 - 104 U/L Final     AST 07/13/2022 32  10 - 35 U/L Final     ALT 07/13/2022 20  10 - 35 U/L Final     GFR Estimate 07/13/2022 >90  >60 mL/min/1.73m2 Final    Effective December 21, 2021 eGFRcr in adults is calculated using the 2021 CKD-EPI creatinine equation which includes age and gender (Evaristo et al., NEJM, DOI: 10.1056/GRNCzw5469165)                ----- Service Performed and Documented by Resident or Fellow ------

## 2022-08-05 NOTE — PATIENT INSTRUCTIONS
Thank you for coming to see us today!     You were seen today for wrist and ankle pain on your right side  Please make an appointment at [facility] for your wrist and ankle: [phone]  I think PT would be helpful  Keep taking Tylenol and applying voltaren gel as needed  Use naproxen as needed (such as before physical therapy, before a walk)        As always, please call the clinic if you have any questions or concerns. A doctor or nurse is available 24/7 to answer questions.     Be well,    Dr. Paty Story

## 2022-08-10 ENCOUNTER — TRANSFERRED RECORDS (OUTPATIENT)
Dept: HEALTH INFORMATION MANAGEMENT | Facility: CLINIC | Age: 67
End: 2022-08-10

## 2022-09-02 ENCOUNTER — OFFICE VISIT (OUTPATIENT)
Dept: FAMILY MEDICINE | Facility: CLINIC | Age: 67
End: 2022-09-02
Payer: COMMERCIAL

## 2022-09-02 VITALS
DIASTOLIC BLOOD PRESSURE: 84 MMHG | OXYGEN SATURATION: 96 % | TEMPERATURE: 98.1 F | RESPIRATION RATE: 16 BRPM | HEART RATE: 73 BPM | BODY MASS INDEX: 30.72 KG/M2 | SYSTOLIC BLOOD PRESSURE: 137 MMHG | WEIGHT: 153 LBS

## 2022-09-02 DIAGNOSIS — Z01.818 PRE-OPERATIVE EXAMINATION: Primary | ICD-10-CM

## 2022-09-02 DIAGNOSIS — Z01.818 PREOP GENERAL PHYSICAL EXAM: ICD-10-CM

## 2022-09-02 PROCEDURE — 99214 OFFICE O/P EST MOD 30 MIN: CPT | Mod: GC

## 2022-09-02 NOTE — PATIENT INSTRUCTIONS
Stop naproxen 4 days before surgery    Return 72 hours (3 days) before surgery for covid test       Preparing for Your Surgery  Getting started  A nurse will call you to review your health history and instructions. They will give you an arrival time based on your scheduled surgery time. Please be ready to share:    Your doctor's clinic name and phone number    Your medical, surgical and anesthesia history    A list of allergies and sensitivities    A list of medicines, including herbal treatments and over-the-counter drugs    Whether the patient has a legal guardian (ask how to send us the papers in advance)  Please tell us if you're pregnant--or if there's any chance you might be pregnant. Some surgeries may injure a fetus (unborn baby), so they require a pregnancy test. Surgeries that are safe for a fetus don't always need a test, and you can choose whether to have one.   If you have a child who's having surgery, please ask for a copy of Preparing for Your Child's Surgery.    Preparing for surgery    Within 30 days of surgery: Have a pre-op exam (sometimes called an H&P, or History and Physical). This can be done at a clinic or pre-operative center.  ? If you're having a , you may not need this exam. Talk to your care team.    At your pre-op exam, talk to your care team about all medicines you take. If you need to stop any medicines before surgery, ask when to start taking them again.  ? We do this for your safety. Many medicines can make you bleed too much during surgery. Some change how well surgery (anesthesia) drugs work.    Call your insurance company to let them know you're having surgery. (If you don't have insurance, call [phone].)    Call your clinic if there's any change in your health. This includes signs of a cold or flu (sore throat, runny nose, cough, rash, fever). It also includes a scrape or scratch near the surgery site.    If you have questions on the day of surgery, call your  hospital or surgery center.  COVID testing  You may need to be tested for COVID-19 before having surgery. If so, we will give you instructions.  Eating and drinking guidelines  For your safety: Unless your surgeon tells you otherwise, follow the guidelines below.    Eat and drink as usual until 8 hours before surgery. After that, no food or milk.    Drink clear liquids until 2 hours before surgery. These are liquids you can see through, like water, Gatorade and Propel Water. You may also have black coffee and tea (no cream or milk).    Nothing by mouth within 2 hours of surgery. This includes gum, candy and breath mints.    If you drink alcohol: Stop drinking it the night before surgery.    If your care team tells you to take medicine on the morning of surgery, it's okay to take it with a sip of water.  Preventing infection    Shower or bathe the night before and morning of your surgery. Follow the instructions your clinic gave you. (If no instructions, use regular soap.)    Don't shave or clip hair near your surgery site. We'll remove the hair if needed.    Don't smoke or vape the morning of surgery. You may chew nicotine gum up to 2 hours before surgery. A nicotine patch is okay.  ? Note: Some surgeries require you to completely quit smoking and nicotine. Check with your surgeon.    Your care team will make every effort to keep you safe from infection. We will:  ? Clean our hands often with soap and water (or an alcohol-based hand rub).  ? Clean the skin at your surgery site with a special soap that kills germs.  ? Give you a special gown to keep you warm. (Cold raises the risk of infection.)  ? Wear special hair covers, masks, gowns and gloves during surgery.  ? Give antibiotic medicine, if prescribed. Not all surgeries need antibiotics.  What to bring on the day of surgery    Photo ID and insurance card    Copy of your health care directive, if you have one    Glasses and hearing aides (bring cases)  ? You can't  wear contacts during surgery    Inhaler and eye drops, if you use them (tell us about these when you arrive)    CPAP machine or breathing device, if you use them    A few personal items, if spending the night    If you have . . .  ? A pacemaker, ICD (cardiac defibrillator) or other implant: Bring the ID card.  ? An implanted stimulator: Bring the remote control.  ? A legal guardian: Bring a copy of the certified (court-stamped) guardianship papers.  Please remove any jewelry, including body piercings. Leave jewelry and other valuables at home.  If you're going home the day of surgery    You must have a responsible adult drive you home. They should stay with you overnight as well.    If you don't have someone to stay with you, and you aren't safe to go home alone, we may keep you overnight. Insurance often won't pay for this.  After surgery  If it's hard to control your pain or you need more pain medicine, please call your surgeon's office.  Questions?   If you have any questions for your care team, list them here: _________________________________________________________________________________________________________________________________________________________________________ ____________________________________ ____________________________________ ____________________________________  For informational purposes only. Not to replace the advice of your health care provider. Copyright   2003, 2019 [facility]. All rights reserved. Clinically reviewed by Brittny Booker MD. [employer] [id number] - REV 07/21.

## 2022-09-02 NOTE — PROGRESS NOTES
Preceptor attestation:  Vital signs reviewed: /84 (BP Location: Left arm, Patient Position: Sitting, Cuff Size: Adult Regular)   Pulse 73   Temp 98.1  F (36.7  C) (Oral)   Resp 16   Wt 69.4 kg (153 lb)   SpO2 96%   BMI 30.72 kg/m      Patient seen, evaluated, and discussed with the resident.  I have verified the content of the note, which accurately reflects my assessment of the patient and the plan of care.    PDMP Review       Value Time User    State PDMP site checked  Yes 9/2/2022  9:46 AM Lainey Tracy MD        Supervising physician: Lainey Tracy MD  [facility]

## 2022-09-02 NOTE — PROGRESS NOTES
[facility] [address] [place] [address]  Phone: [phone]  Fax: [phone]  Primary Provider: Lainey Tracy  Pre-op Performing Provider: AUBREY SIMON    956}  PREOPERATIVE EVALUATION:  Today's date: 9/2/2022    Priscila Watkins is a 67 year old female who presents for a preoperative evaluation.    Surgical Information:  Surgery/Procedure: ulnar nerve release right elbow   Surgery Location: [facility]  Surgeon: unknown  Surgery Date: 9/8/22  Time of Surgery: be there at 645 am  Where patient plans to recover: At home with family  Fax number for surgical facility: [phone]    Type of Anesthesia Anticipated: to be determined    Assessment & Plan     The proposed surgical procedure is considered INTERMEDIATE risk.    Pre-operative examination  Unremarkable pre-op exam. No history of diabetes. Previously diagnosed with hypertension, not currently requiring mediation. No history of heart disease or CVA.   -Return for COVID-19 lab 72 hours prior to surgery, will fax result to [facility]  - hold naproxen 4 days before surgery      Risks and Recommendations:  The patient has the following additional risks and recommendations for perioperative complications:   - No identified additional risk factors other than previously addressed    Medication Instructions:   - naproxen (Aleve, Naprosyn): HOLD 4 days before surgery.     RECOMMENDATION:  APPROVAL GIVEN to proceed with proposed procedure, without further diagnostic evaluation.    Review of external notes as documented above       40 minutes spent on the date of the encounter doing chart review, history and exam, documentation and further activities per the note    Tika Simon MD PGY2  [facility]  09/02/22    I precepted today with Dr. Tracy      Subjective     HPI related to upcoming procedure: Hx of distal radius fracture of right wrist a couple years ago. Ongoing nerve pain since then. Will have  nerve release surgery on right elbow.     Preop Questions 9/2/2022   1. Have you ever had a heart attack or stroke? No   2. Have you ever had surgery on your heart or blood vessels, such as a stent placement, a coronary artery bypass, or surgery on an artery in your head, neck, heart, or legs? No   3. Do you have chest pain with activity? No   4. Do you have a history of  heart failure? No   5. Do you currently have a cold, bronchitis or symptoms of other infection? No   6. Do you have a cough, shortness of breath, or wheezing? No   7. Do you or anyone in your family have previous history of blood clots? No   8. Do you or does anyone in your family have a serious bleeding problem such as prolonged bleeding following surgeries or cuts? No   9. Have you ever had problems with anemia or been told to take iron pills? No   10. Have you had any abnormal blood loss such as black, tarry or bloody stools, or abnormal vaginal bleeding? No   11. Have you ever had a blood transfusion? No   12. Are you willing to have a blood transfusion if it is medically needed before, during, or after your surgery? Yes    13. Have you or any of your relatives ever had problems with anesthesia? No   14. Do you have sleep apnea, excessive snoring or daytime drowsiness? No   15. Do you have any artifical heart valves or other implanted medical devices like a pacemaker, defibrillator, or continuous glucose monitor? No   16. Do you have artificial joints? No   17. Are you allergic to latex? No       Health Care Directive:  Patient does not have a Health Care Directive or Living Will: Discussed advance care planning with patient; information given to patient to review.    Preoperative Review of :   reviewed - no record of controlled substances prescribed.        Review of Systems  Constitutional, neuro, ENT, endocrine, pulmonary, cardiac, gastrointestinal, genitourinary, musculoskeletal, integument and psychiatric systems are negative, except  as otherwise noted.    Patient Active Problem List    Diagnosis Date Noted     Prediabetes 10/13/2017     Priority: Medium     Lab Results   Component Value Date    A1C 5.5 08/08/2018    A1C 5.9 06/30/2016            Hearing loss of both ears 05/29/2014     Priority: Medium     Bilateral profound hearing loss.  ENT note May 2014.       Essential hypertension, benign 12/03/2012     Priority: Medium     Lumbosacral spondylosis without myelopathy 12/03/2012     Priority: Medium     Health Care Home 12/03/2012     Priority: Medium     Tier Level: 1  DX V65.8 REPLACED WITH 13557 HEALTH CARE HOME (04/08/2013)       Medial epicondylitis 12/03/2012     Priority: Medium      Past Medical History:   Diagnosis Date     Hypertension        History reviewed. No pertinent surgical history.  Current Outpatient Medications   Medication Sig Dispense Refill     acetaminophen (TYLENOL) 325 MG tablet Take 1-2 tablets (325-650 mg) by mouth every 6 hours as needed for mild pain 120 tablet 3     naproxen (NAPROSYN) 250 MG tablet Take 1 tablet (250 mg) by mouth 2 times daily as needed for moderate pain 60 tablet 1       Allergies   Allergen Reactions     Lisinopril Cough     Nkda [No Known Drug Allergies]         Social History     Tobacco Use     Smoking status: Never Smoker     Smokeless tobacco: Never Used   Substance Use Topics     Alcohol use: No     Family History:   No reactions to anesthesia       History   Drug Use Unknown         Objective     /84 (BP Location: Left arm, Patient Position: Sitting, Cuff Size: Adult Regular)   Pulse 73   Temp 98.1  F (36.7  C) (Oral)   Resp 16   Wt 69.4 kg (153 lb)   SpO2 96%   BMI 30.72 kg/m      Physical Exam    GENERAL APPEARANCE: healthy, alert and no distress     EYES: EOMI, PERRL     HENT: ear canals and TM's normal and nose and mouth without ulcers or lesions     NECK: no adenopathy, no asymmetry, masses, or scars and thyroid normal to palpation     RESP: lungs clear to  auscultation - no rales, rhonchi or wheezes     CV: regular rates and rhythm, normal S1 S2, no S3 or S4 and no murmur, click or rub     ABDOMEN:  soft, nontender, no HSM or masses and bowel sounds normal     MS: extremities normal- no gross deformities noted, no evidence of inflammation in joints, FROM in all extremities.     SKIN: no suspicious lesions or rashes     NEURO: Normal strength and tone, sensory exam grossly normal, mentation intact and speech normal     PSYCH: mentation appears normal. and affect normal/bright     LYMPHATICS: No cervical adenopathy    Recent Labs   Lab Test 07/13/22  0850 12/13/21  0915 10/13/21  0904   HGB  --   --  12.5   PLT  --   --  333    139 140   POTASSIUM 4.1 3.9 3.5   CR 0.69 0.76 0.78   A1C 5.7*  --  5.7*        Diagnostics:  No labs were ordered during this visit.   No EKG required, no history of coronary heart disease, significant arrhythmia, peripheral arterial disease or other structural heart disease.    Revised Cardiac Risk Index (RCRI):  The patient has the following serious cardiovascular risks for perioperative complications:   - No serious cardiac risks = 0 points     RCRI Interpretation: 0 points: Class I (very low risk - 0.4% complication rate)    Return for COVID-19 lab 72 hours prior to surgery         Signed Electronically by: Taylor Simon MD  Copy of this evaluation report is provided to requesting physician.    Tika Simon MD PGY2  [facility]  09/02/22    I precepted today with Dr. Tracy

## 2022-09-06 ENCOUNTER — LAB (OUTPATIENT)
Dept: LAB | Facility: CLINIC | Age: 67
End: 2022-09-06

## 2022-09-06 DIAGNOSIS — Z01.818 PRE-OPERATIVE EXAMINATION: ICD-10-CM

## 2022-09-06 LAB — SARS-COV-2 RNA RESP QL NAA+PROBE: NEGATIVE

## 2022-09-06 PROCEDURE — U0005 INFEC AGEN DETEC AMPLI PROBE: HCPCS

## 2022-09-06 PROCEDURE — U0003 INFECTIOUS AGENT DETECTION BY NUCLEIC ACID (DNA OR RNA); SEVERE ACUTE RESPIRATORY SYNDROME CORONAVIRUS 2 (SARS-COV-2) (CORONAVIRUS DISEASE [COVID-19]), AMPLIFIED PROBE TECHNIQUE, MAKING USE OF HIGH THROUGHPUT TECHNOLOGIES AS DESCRIBED BY CMS-2020-01-R: HCPCS

## 2022-09-08 ENCOUNTER — TRANSFERRED RECORDS (OUTPATIENT)
Dept: HEALTH INFORMATION MANAGEMENT | Facility: CLINIC | Age: 67
End: 2022-09-08

## 2022-09-21 ENCOUNTER — TRANSFERRED RECORDS (OUTPATIENT)
Dept: HEALTH INFORMATION MANAGEMENT | Facility: CLINIC | Age: 67
End: 2022-09-21

## 2022-10-20 ENCOUNTER — TRANSFERRED RECORDS (OUTPATIENT)
Dept: HEALTH INFORMATION MANAGEMENT | Facility: CLINIC | Age: 67
End: 2022-10-20

## 2023-09-19 ENCOUNTER — OFFICE VISIT (OUTPATIENT)
Dept: FAMILY MEDICINE | Facility: CLINIC | Age: 68
End: 2023-09-19
Payer: COMMERCIAL

## 2023-09-19 ENCOUNTER — LAB (OUTPATIENT)
Dept: FAMILY MEDICINE | Facility: CLINIC | Age: 68
End: 2023-09-19

## 2023-09-19 VITALS
TEMPERATURE: 97 F | BODY MASS INDEX: 30.76 KG/M2 | SYSTOLIC BLOOD PRESSURE: 170 MMHG | DIASTOLIC BLOOD PRESSURE: 84 MMHG | WEIGHT: 152.6 LBS | HEIGHT: 59 IN | HEART RATE: 72 BPM | OXYGEN SATURATION: 97 %

## 2023-09-19 DIAGNOSIS — G89.29 CHRONIC PAIN OF RIGHT KNEE: ICD-10-CM

## 2023-09-19 DIAGNOSIS — Z00.00 ENCOUNTER FOR MEDICARE ANNUAL WELLNESS EXAM: ICD-10-CM

## 2023-09-19 DIAGNOSIS — Z12.11 SCREEN FOR COLON CANCER: ICD-10-CM

## 2023-09-19 DIAGNOSIS — B36.0 TINEA VERSICOLOR: ICD-10-CM

## 2023-09-19 DIAGNOSIS — R73.03 PREDIABETES: ICD-10-CM

## 2023-09-19 DIAGNOSIS — I10 PRIMARY HYPERTENSION: ICD-10-CM

## 2023-09-19 DIAGNOSIS — M25.561 CHRONIC PAIN OF RIGHT KNEE: ICD-10-CM

## 2023-09-19 DIAGNOSIS — Z12.31 VISIT FOR SCREENING MAMMOGRAM: Primary | ICD-10-CM

## 2023-09-19 PROCEDURE — 90471 IMMUNIZATION ADMIN: CPT | Performed by: FAMILY MEDICINE

## 2023-09-19 PROCEDURE — 90677 PCV20 VACCINE IM: CPT | Performed by: FAMILY MEDICINE

## 2023-09-19 PROCEDURE — 99397 PER PM REEVAL EST PAT 65+ YR: CPT | Mod: 25 | Performed by: FAMILY MEDICINE

## 2023-09-19 PROCEDURE — 90472 IMMUNIZATION ADMIN EACH ADD: CPT | Performed by: FAMILY MEDICINE

## 2023-09-19 PROCEDURE — 90662 IIV NO PRSV INCREASED AG IM: CPT | Performed by: FAMILY MEDICINE

## 2023-09-19 RX ORDER — CLOTRIMAZOLE 1 %
CREAM (GRAM) TOPICAL 2 TIMES DAILY
Qty: 30 G | Refills: 0 | Status: SHIPPED | OUTPATIENT
Start: 2023-09-19 | End: 2024-05-17

## 2023-09-19 RX ORDER — LOSARTAN POTASSIUM 25 MG/1
25 TABLET ORAL DAILY
Qty: 90 TABLET | Refills: 1 | Status: SHIPPED | OUTPATIENT
Start: 2023-09-19 | End: 2024-02-27

## 2023-09-19 ASSESSMENT — ENCOUNTER SYMPTOMS
HEMATOCHEZIA: 0
SHORTNESS OF BREATH: 1
DIARRHEA: 0
JOINT SWELLING: 0
CONSTIPATION: 0
DYSURIA: 0
FREQUENCY: 0
ARTHRALGIAS: 1
HEADACHES: 0
FEVER: 0
ABDOMINAL PAIN: 0
WEAKNESS: 0
NERVOUS/ANXIOUS: 0
MYALGIAS: 0
EYE PAIN: 0
HEARTBURN: 0
DIZZINESS: 0
COUGH: 0
CHILLS: 0
HEMATURIA: 0
PARESTHESIAS: 0
PALPITATIONS: 0
NAUSEA: 0
SORE THROAT: 0

## 2023-09-19 ASSESSMENT — ACTIVITIES OF DAILY LIVING (ADL)
CURRENT_FUNCTION: BATHING REQUIRES ASSISTANCE
CURRENT_FUNCTION: LAUNDRY REQUIRES ASSISTANCE
CURRENT_FUNCTION: SHOPPING REQUIRES ASSISTANCE
CURRENT_FUNCTION: TELEPHONE REQUIRES ASSISTANCE
CURRENT_FUNCTION: PREPARING MEALS REQUIRES ASSISTANCE
CURRENT_FUNCTION: TRANSPORTATION REQUIRES ASSISTANCE
CURRENT_FUNCTION: MEDICATION ADMINISTRATION REQUIRES ASSISTANCE
CURRENT_FUNCTION: HOUSEWORK REQUIRES ASSISTANCE

## 2023-09-19 NOTE — PROGRESS NOTES
"SUBJECTIVE:   Priscila is a 68 year old who presents for Preventive Visit.      9/19/2023     8:11 AM   Additional Questions   Roomed by hunt   Accompanied by daughter     Are you in the first 12 months of your Medicare coverage?  No    Healthy Habits:     In general, how would you rate your overall health?  Fair    Frequency of exercise:  2-3 days/week    Duration of exercise:  15-30 minutes    Do you usually eat at least 4 servings of fruit and vegetables a day, include whole grains    & fiber and avoid regularly eating high fat or \"junk\" foods?  Yes    Taking medications regularly:  Yes    Medication side effects:  None    Ability to successfully perform activities of daily living:  Telephone requires assistance, transportation requires assistance, shopping requires assistance, preparing meals requires assistance, housework requires assistance, bathing requires assistance, laundry requires assistance and medication administration requires assistance    Home Safety:  No safety concerns identified    Hearing Impairment:  Difficulty following a conversation in a noisy restaurant or crowded room, feel that people are mumbling or not speaking clearly, difficulty following dialogue in the theater, difficult to understand a speaker at a public meeting or [employer] service, need to ask people to speak up or repeat themselves, difficulty understanding soft or whispered speech and difficulty understanding speech on the telephone    In the past 6 months, have you been bothered by leaking of urine?  No    In general, how would you rate your overall mental or emotional health?  Good    Additional concerns today:  Yes    Have you ever done Advance Care Planning? (For example, a Health Directive, POLST, or a discussion with a medical provider or your loved ones about your wishes): No    Fall risk  Fallen 2 or more times in the past year?: No  Any fall with injury in the past year?: No    Cognitive Screening   Recalls 3 " objects  Results: 3 items recalled: COGNITIVE IMPAIRMENT LESS LIKELY  Mini-CogTM Copyright [employer]. Licensed by the author for use in [facility]; reprinted with permission (velia@.[facility]). All rights reserved.      Do you have sleep apnea, excessive snoring or daytime drowsiness? : no    Reviewed and updated as needed this visit by clinical staff   Tobacco  Allergies  Meds              Reviewed and updated as needed this visit by Provider                 Social History     Tobacco Use    Smoking status: Never    Smokeless tobacco: Never   Substance Use Topics    Alcohol use: No         9/19/2023     8:20 AM   Alcohol Use   Prescreen: >3 drinks/day or >7 drinks/week? No   Do you have a current opioid prescription? No  Do you use any other controlled substances or medications that are not prescribed by a provider? None    Current providers sharing in care for this patient include:   Patient Care Team:  Lainey Tracy MD as PCP - General (Family Practice)  Lainey Tracy MD as Assigned PCP    The following health maintenance items are reviewed in Epic and correct as of today:  Health Maintenance   Topic Date Due    DEXA  Never done    ZOSTER IMMUNIZATION (1 of 2) Never done    MAMMO SCREENING  03/27/2021    COVID-19 Vaccine (5 - Pfizer series) 09/30/2022    MEDICARE ANNUAL WELLNESS VISIT  01/10/2023    Pneumococcal Vaccine: 65+ Years (2 - PCV) 01/10/2023    COLORECTAL CANCER SCREENING  01/11/2023    A1C  07/13/2023    INFLUENZA VACCINE (1) 09/01/2023    FALL RISK ASSESSMENT  09/19/2024    LIPID  10/13/2026    ADVANCE CARE PLANNING  01/10/2027    DTAP/TDAP/TD IMMUNIZATION (5 - Td or Tdap) 10/13/2031    HEPATITIS C SCREENING  Completed    PHQ-2 (once per calendar year)  Completed    IPV IMMUNIZATION  Aged Out    HPV IMMUNIZATION  Aged Out    MENINGITIS IMMUNIZATION  Aged Out    PAP  Discontinued     BP Readings from Last 3 Encounters:   09/19/23 (!) 170/84   09/02/22 137/84   08/05/22 (!) 143/84     Wt Readings from Last 3 Encounters:   09/19/23 69.2 kg (152 lb 9.6 oz)   09/02/22 69.4 kg (153 lb)   08/05/22 68.5 kg (151 lb)                  Patient Active Problem List   Diagnosis    Essential hypertension, benign    Lumbosacral spondylosis without myelopathy    Health Care Home    Medial epicondylitis    Hearing loss of both ears    Prediabetes     No past surgical history on file.    Social History     Tobacco Use    Smoking status: Never    Smokeless tobacco: Never   Substance Use Topics    Alcohol use: No     Family History   Problem Relation Age of Onset    No Known Problems Mother     No Known Problems Father     No Known Problems Maternal Grandmother     No Known Problems Maternal Grandfather     No Known Problems Paternal Grandmother     No Known Problems Paternal Grandfather     No Known Problems Brother     No Known Problems Sister     No Known Problems Son     No Known Problems Daughter     No Known Problems Maternal Half-Brother     No Known Problems Maternal Half-Sister     No Known Problems Paternal Half-Brother     No Known Problems Paternal Half-Sister     No Known Problems Niece     No Known Problems Nephew     No Known Problems Cousin     No Known Problems Other     Diabetes No family hx of     Cancer No family hx of     Heart Disease No family hx of     Coronary Artery Disease No family hx of     Hypertension No family hx of     Hyperlipidemia No family hx of     Kidney Disease No family hx of     Cerebrovascular Disease No family hx of     Obesity No family hx of     Thrombosis No family hx of     Asthma No family hx of     Arthritis No family hx of     Thyroid Disease No family hx of     Depression No family hx of     Mental Illness No family hx of     Substance Abuse No family hx of     Cystic Fibrosis No family hx of     Early Death No family hx of     Coronary Artery Disease Early Onset No family hx of     Heart Failure No family hx of     Bleeding Diathesis No family hx of      Dementia No family hx of     Breast Cancer No family hx of     Ovarian Cancer No family hx of     Uterine Cancer No family hx of     Prostate Cancer No family hx of     Colorectal Cancer No family hx of     Pancreatic Cancer No family hx of     Lung Cancer No family hx of     Melanoma No family hx of     Autoimmune Disease No family hx of     Unknown/Adopted No family hx of     Genetic Disorder No family hx of          Current Outpatient Medications   Medication Sig Dispense Refill    acetaminophen (TYLENOL) 325 MG tablet Take 1-2 tablets (325-650 mg) by mouth every 6 hours as needed for mild pain 120 tablet 3    naproxen (NAPROSYN) 250 MG tablet Take 1 tablet (250 mg) by mouth 2 times daily as needed for moderate pain 60 tablet 1     Allergies   Allergen Reactions    Lisinopril Cough    Nkda [No Known Drug Allergy]      Recent Labs   Lab Test 07/13/22  0850 12/13/21  0915 10/13/21  0904 10/13/21  0904 08/28/20  0714 09/24/19  0951 08/08/18  1123 02/07/18  1521 06/30/16  0920   A1C 5.7*  --   --  5.7*  --  5.5 5.5  --   --    LDL  --   --   --  101  --   --  90  --  97   HDL  --   --   --  49*  --   --  52.5  --  48.9   TRIG  --   --   --  303*  --   --  274.4*  --  137.6   ALT 20  --   --   --   --   --   --   --   --    CR 0.69 0.76   < > 0.78 0.9 0.8 0.7   < > 0.7   GFRESTIMATED >90 82   < > 79 >60 76.8 >90   < > 90.7   GFRESTBLACK  --   --   --   --  >60 >90 >90   < > 109.8   POTASSIUM 4.1 3.9   < > 3.5  --  4.1 3.6   < > 3.7    < > = values in this interval not displayed.      Review of Systems   Constitutional:  Negative for chills and fever.   HENT:  Negative for congestion, ear pain, hearing loss and sore throat.    Eyes:  Negative for pain and visual disturbance.   Respiratory:  Positive for shortness of breath. Negative for cough.    Cardiovascular:  Negative for chest pain, palpitations and peripheral edema.   Gastrointestinal:  Negative for abdominal pain, constipation, diarrhea, heartburn, hematochezia  "and nausea.   Genitourinary:  Negative for dysuria, frequency, genital sores, hematuria and urgency.   Musculoskeletal:  Positive for arthralgias. Negative for joint swelling and myalgias.   Skin:  Negative for rash.   Neurological:  Negative for dizziness, weakness, headaches and paresthesias.   Psychiatric/Behavioral:  Negative for mood changes. The patient is not nervous/anxious.      OBJECTIVE:   BP (!) 170/84 (BP Location: Right arm, Patient Position: Sitting, Cuff Size: Adult Regular)   Pulse 72   Temp 97  F (36.1  C) (Tympanic)   Ht 1.501 m (4' 11.1\")   Wt 69.2 kg (152 lb 9.6 oz)   SpO2 97%   BMI 30.72 kg/m   Estimated body mass index is 30.72 kg/m  as calculated from the following:    Height as of this encounter: 1.501 m (4' 11.1\").    Weight as of this encounter: 69.2 kg (152 lb 9.6 oz).  Physical Exam  GENERAL APPEARANCE: healthy, alert and no distress  EYES: Eyes grossly normal to inspection, PERRL and conjunctivae and sclerae normal  HENT: ear canals and TM's normal, nose and mouth without ulcers or lesions; several extracted teeth; oropharynx clear and oral mucous membranes moist  NECK: no adenopathy, no asymmetry, masses, or scars and thyroid normal to palpation  RESP: lungs clear to auscultation - no rales, rhonchi or wheezes  CV: regular rate and rhythm, normal S1 S2, no S3 or S4, no murmur, click or rub, no peripheral edema and peripheral pulses strong  ABDOMEN: soft, nontender, no hepatosplenomegaly, no masses and bowel sounds normal  MS: no musculoskeletal defects are noted and gait is age appropriate without ataxia  SKIN: no suspicious lesions or rashes  NEURO: Normal strength and tone, sensory exam grossly normal, mentation intact and speech normal  PSYCH: mentation appears normal and affect normal/bright    ASSESSMENT / PLAN:     MEDICARE PERSONAL PREVENTIVE SERVICES PLAN - SERVICES                                                     IMMUNIZATIONS Description Recommend today?   "   Hepatitis B  If you have any of the following risk factors you should be immunized for hepatitis B: severe kidney disease, people who live in the same house as a carrier of Hepatitis B virus, people who live in  institutions (e.g. nursing homes or group homes), homosexual men, patients with hemophilia who received Factor VIII or IX concentrates, abusers of illicit injectable drugs No; is up to date.     SCREENING TESTS     Description   Year of Last Screening   Recommended Today?   Heart disease screening blood tests  Cholesterol level Reducing cholesterol can reduce your risk of heart attacks by 25%.  Screening is recommended yearly if you are at risk of heart disease otherwise every 4-5 years 10/13/21 -  Yes; Recommended and ordered.   Diabetes screening tests  Glucose  Hemoglobin A1c blood test   Finding and treating diabetes early can reduce complications.  Screening recommended/covered yearly if you have high blood pressure, high cholesterol, obesity (BMI >30), or a history of high blood glucose tests; or 2 of the following: family history of diabetes, overweight (BMI >25 but <30), age 65 years or older, and a history of diabetes of pregnancy or gave birth to baby weighing more than 9 lbs. 10/13/21 - A1c 5.7; hx prediabetes (5.9% in past) Yes; Recommended and ordered.   Hepatitis B screening Finding hepatitis B early can reduce complications.  Screening is recommended for persons with selected risk factors. sAg neg (1/24/12) No; is up to date.   Hepatitis C screening Finding hepatitis C early can reduce complications.  Screening is recommended for all persons born from 1945 through 1965 and for those with selected other risk factors.  Ab neg (8/8/18) No; is up to date.   HIV screening Finding HIV early can reduce complications.  Screening is recommended for persons with risk factors for HIV infection. Negative (8/8/18) No; is up to date.   Glaucoma screening Early detection of glaucoma can prevent  blindness.   Please talk to your eye doctor about this.       SCREENING TESTS     Description   Year of Last Screening   Recommended Today?   Colorectal cancer screening  Fecal occult blood test   Screening colonoscopy Screening for colon cancer has been shown to reduce death from colon cancer by 25-30%. Screening recommended to start at 50 years and continuing until age 75 years.   FIT negative (1/11/22) Yes; Recommended and ordered.   Breast Cancer Screening (women)  Mammogram Mammogram screening for breast cancer has been shown to reduce the risk of dying from breast cancer and prolong life. Screening is recommended every 1-2 years for women aged 50 to 74 years.  Negative (3/27/19) Yes; Recommended and ordered.   Cervical Cancer screening (women)  Pap Cervical pap smears can reduce cervical cancer. Screening is recommended annually if high risk (history of abnormal pap smears) otherwise every 2-3 years, stop screening at 65 years of age if history of normal paps. Negative co-testing (10/13/17) No: is not indicated today.  Age >65.   Screening for Osteoporosis:  Bone mass measurements (women)  Dexa Scan Screening and treating Osteoporosis can reduce the risk of hip and spine fractures. Screening is recommended in women 65 years or older and in women and men at risk of osteoporosis. No previous Yes; Recommended and ordered.   Screening for Lung Cancer   Low-dose CT scanning Screening can reduce mortality in persons aged 55-80 who have smoked at least 30 pack-years and who are either still smoking or have quit in the past 15 years. N/A - never smoker No: is not indicated today.   Abdominal Aortic Aneurysm (AAA) screening  Ultrasound (US)   An aneurysm treated before rupture is very safe -a ruptured aneurysm can be fatal.  Screening  by US for AAA is limited to patients who meet one of the following criteria:  Men who are 65-75 years old and have smoked more than 100 cigarettes in their lifetime  Anyone with a family  history of abdominal aortic aneurysm N/A - never smoker No: is not indicated today.                                                       IMMUNIZATIONS Description Recommend today?     Influenza (Flu shot) Prevents flu; should get every year Yes; Recommended and ordered.   PCV 13 Pneumonia vaccination; you get it once No; is up to date. (1/10/22)   PPSV 20 Second pneumonia vaccination Yes; Recommended and ordered.   Zoster (Shingles) Prevents shingles; you get it once  (Check with Part D insurance for coverage, must receive at a pharmacy, not clinic) Yes; Recommended at outside pharmacy.   Tetanus Prevents tetanus; once every 10 years No; is up to date. (10/13/21)     Immunizations Administered       Name Date Dose VIS Date Route    Influenza Vaccine 65+ (Fluzone HD) 9/19/23  9:15 AM 0.7 mL 8/6/21 Intramuscular    Pneumococcal 20 valent Conjugate (Prevnar 20) 9/19/23  9:15 AM 0.5 mL 02/04/2022, Given Today Intramuscular            Other:  Knee pain.  DME for knee sleeves.  Tinea versicolor.  Topical clotrimazole.    Lainey Tracy MD  [facility]

## 2023-09-19 NOTE — PROGRESS NOTES
Prior to immunization administration, verified patients identity using patient s name and date of birth. Please see Immunization Activity for additional information.     Screening Questionnaire for Adult Immunization    Are you sick today?   No   Do you have allergies to medications, food, a vaccine component or latex?   No   Have you ever had a serious reaction after receiving a vaccination?   No   Do you have a long-term health problem with heart, lung, kidney, or metabolic disease (e.g., diabetes), asthma, a blood disorder, no spleen, complement component deficiency, a cochlear implant, or a spinal fluid leak?  Are you on long-term aspirin therapy?   No   Do you have cancer, leukemia, HIV/AIDS, or any other immune system problem?   No   Do you have a parent, brother, or sister with an immune system problem?   No   In the past 3 months, have you taken medications that affect  your immune system, such as prednisone, other steroids, or anticancer drugs; drugs for the treatment of rheumatoid arthritis, Crohn s disease, or psoriasis; or have you had radiation treatments?   No   Have you had a seizure, or a brain or other nervous system problem?   No   During the past year, have you received a transfusion of blood or blood    products, or been given immune (gamma) globulin or antiviral drug?   No   For women: Are you pregnant or is there a chance you could become       pregnant during the next month?   No   Have you received any vaccinations in the past 4 weeks?   No     Immunization questionnaire answers were all negative.      Patient instructed to remain in clinic for 15 minutes afterwards, and to report any adverse reactions.     Screening performed by Ezra Barreto MA on 9/19/2023 at 9:16 AM.

## 2023-09-19 NOTE — PATIENT INSTRUCTIONS
Patient Education   Personalized Prevention Plan  You are due for the preventive services outlined below.  Your care team is available to assist you in scheduling these services.  If you have already completed any of these items, please share that information with your care team to update in your medical record.  Health Maintenance Due   Topic Date Due     Osteoporosis Screening  Never done     Zoster (Shingles) Vaccine (1 of 2) Never done     Mammogram  03/27/2021     COVID-19 Vaccine (5 - Pfizer series) 09/30/2022     Pneumococcal Vaccine (2 - PCV) 01/10/2023     Colorectal Cancer Screening  01/11/2023     A1C Lab  07/13/2023     Flu Vaccine (1) 09/01/2023     Preventive Health Recommendations    See your health care provider every year to  Review health changes.   Discuss preventive care.    Review your medicines if your doctor has prescribed any.  You no longer need a yearly Pap test unless you've had an abnormal Pap test in the past 10 years. If you have vaginal symptoms, such as bleeding or discharge, be sure to talk with your provider about a Pap test.  Every 1 to 2 years, have a mammogram.  If you are over 69, talk with your health care provider about whether or not you want to continue having screening mammograms.  Every 10 years, have a colonoscopy. Or, have a yearly FIT test (stool test). These exams will check for colon cancer.   Have a cholesterol test every 5 years, or more often if your doctor advises it.   Have a diabetes test (fasting glucose) every three years. If you are at risk for diabetes, you should have this test more often.   At age 65, have a bone density scan (DEXA) to check for osteoporosis (brittle bone disease).    Shots:  Get a flu shot each year.  Get a tetanus shot every 10 years.  Talk to your doctor about your pneumonia vaccines. There are now two you should receive - Pneumovax (PPSV 23) and Prevnar (PCV 13).  Talk to your pharmacist about the shingles vaccine.  Talk to your doctor  about the hepatitis B vaccine.    Nutrition:   Eat at least 5 servings of fruits and vegetables each day.  Eat whole-grain bread, whole-wheat pasta and brown rice instead of white grains and rice.  Get adequate Calcium and Vitamin D.     Lifestyle  Exercise at least 150 minutes a week (30 minutes a day, 5 days a week). This will help you control your weight and prevent disease.  Limit alcohol to one drink per day.  No smoking.   Wear sunscreen to prevent skin cancer.   See your dentist twice a year for an exam and cleaning.  See your eye doctor every 1 to 2 years to screen for conditions such as glaucoma, macular degeneration and cataracts.    Personalized Prevention Plan  You are due for the preventive services outlined below.  Your care team is available to assist you in scheduling these services.  If you have already completed any of these items, please share that information with your care team to update in your medical record.  Health Maintenance   Topic Date Due     DEXA  Never done     ZOSTER IMMUNIZATION (1 of 2) Never done     MAMMO SCREENING  03/27/2021     COVID-19 Vaccine (5 - Pfizer series) 09/30/2022     Pneumococcal Vaccine: 65+ Years (2 - PCV) 01/10/2023     COLORECTAL CANCER SCREENING  01/11/2023     A1C  07/13/2023     INFLUENZA VACCINE (1) 09/01/2023     MEDICARE ANNUAL WELLNESS VISIT  09/19/2024     FALL RISK ASSESSMENT  09/19/2024     LIPID  10/13/2026     ADVANCE CARE PLANNING  01/10/2027     DTAP/TDAP/TD IMMUNIZATION (5 - Td or Tdap) 10/13/2031     HEPATITIS C SCREENING  Completed     PHQ-2 (once per calendar year)  Completed     IPV IMMUNIZATION  Aged Out     HPV IMMUNIZATION  Aged Out     MENINGITIS IMMUNIZATION  Aged Out     PAP  Discontinued

## 2023-09-29 ENCOUNTER — OFFICE VISIT (OUTPATIENT)
Dept: FAMILY MEDICINE | Facility: CLINIC | Age: 68
End: 2023-09-29
Payer: COMMERCIAL

## 2023-09-29 VITALS
WEIGHT: 152.2 LBS | SYSTOLIC BLOOD PRESSURE: 135 MMHG | DIASTOLIC BLOOD PRESSURE: 77 MMHG | OXYGEN SATURATION: 97 % | HEART RATE: 72 BPM | BODY MASS INDEX: 30.64 KG/M2

## 2023-09-29 DIAGNOSIS — I10 PRIMARY HYPERTENSION: ICD-10-CM

## 2023-09-29 DIAGNOSIS — G45.9 TIA (TRANSIENT ISCHEMIC ATTACK): Primary | ICD-10-CM

## 2023-09-29 LAB
ANION GAP SERPL CALCULATED.3IONS-SCNC: 15 MMOL/L (ref 7–15)
BUN SERPL-MCNC: 11.6 MG/DL (ref 8–23)
CALCIUM SERPL-MCNC: 9.7 MG/DL (ref 8.8–10.2)
CHLORIDE SERPL-SCNC: 103 MMOL/L (ref 98–107)
CHOLEST SERPL-MCNC: 241 MG/DL
CREAT SERPL-MCNC: 0.69 MG/DL (ref 0.51–0.95)
DEPRECATED HCO3 PLAS-SCNC: 23 MMOL/L (ref 22–29)
EGFRCR SERPLBLD CKD-EPI 2021: >90 ML/MIN/1.73M2
GLUCOSE SERPL-MCNC: 106 MG/DL (ref 70–99)
HBA1C MFR BLD: 5.8 % (ref 0–5.6)
HDLC SERPL-MCNC: 36 MG/DL
LDLC SERPL CALC-MCNC: ABNORMAL MG/DL
NONHDLC SERPL-MCNC: 205 MG/DL
POTASSIUM SERPL-SCNC: 3.7 MMOL/L (ref 3.4–5.3)
SODIUM SERPL-SCNC: 141 MMOL/L (ref 135–145)
TRIGL SERPL-MCNC: 480 MG/DL

## 2023-09-29 PROCEDURE — 80061 LIPID PANEL: CPT

## 2023-09-29 PROCEDURE — 99214 OFFICE O/P EST MOD 30 MIN: CPT | Mod: GC

## 2023-09-29 PROCEDURE — 36415 COLL VENOUS BLD VENIPUNCTURE: CPT

## 2023-09-29 PROCEDURE — 83036 HEMOGLOBIN GLYCOSYLATED A1C: CPT

## 2023-09-29 PROCEDURE — 80048 BASIC METABOLIC PNL TOTAL CA: CPT

## 2023-09-29 RX ORDER — ASPIRIN 325 MG
1 TABLET ORAL DAILY
COMMUNITY
Start: 2023-09-21

## 2023-09-29 NOTE — PROGRESS NOTES
Preceptor attestation:  Vital signs reviewed: /77 (BP Location: Right arm, Patient Position: Sitting, Cuff Size: Adult Regular)   Pulse 72   Wt 69 kg (152 lb 3.2 oz)   SpO2 97%   Breastfeeding No   BMI 30.64 kg/m      Patient seen, evaluated, and discussed with the resident.  I verified the content of the note, which accurately reflects my assessment of the patient and the plan of care.    Supervising physician: Lainey Tracy MD  [facility]

## 2023-09-29 NOTE — PROGRESS NOTES
Assessment & Plan     Primary hypertension  BP has been well controlled since starting losartan on 9/21. Home BP <140/90, therefore at goal. On losartan 25 mg daily. Tolerating well. Due for BMP.   - Basic metabolic panel  - Discussed goal BP, continue BP log  - Follow up in 3 months (or sooner if above goal)    History of TIA  Seen in ED on 9/20 for dizziness with some weakness in her right arm and leg. These resolved by time of arrival to ED and have not reoccurred.  CTA at that time unremarkable.  Appointment with neurology ([facility]?) next week. Started on aspirin 325 mg daily for TIA in ED. Recommend continuing this until follow up with neurology. Sx may have been from hypertensive crisis as SBP 200s at onset of sx.   - continue aspirin 325 mg daily   - Follow up with neurology     Lipid panel, A1C released from previous visit     Reviewed ED visit from 9/20 and labs/imaging    Return in about 3 months (around 12/29/2023) for Follow up.    Tika Simon MD PGY3  [facility]  09/29/23    I precepted today with Dr. Tracy.      Damien Francois is a 68 year old, presenting for the following health issues:  Follow Up (To ED- b/p)      9/29/2023     2:40 PM   Additional Questions   Roomed by ngf   Accompanied by self, daughter         9/29/2023     2:40 PM   Patient Reported Additional Medications   Patient reports taking the following new medications none       HPI   ED visit 9/20 for dizziness with some weakness in her right arm and leg. These resolved by time of arrival to ED and have not reoccurred.  Overall she is doing well since then.   She is on losartan 25 mg which she started after the ED visit. -137 at home. Tolerating well, no side effects.   Aspirin 325 mg was started in the ED due to concern for TIA. She is still taking this. She has follow up with neurology ([facility]) next week.       Review of Systems   Constitutional, HEENT, cardiovascular,  pulmonary, gi and gu systems are negative, except as otherwise noted.      Objective    /77 (BP Location: Right arm, Patient Position: Sitting, Cuff Size: Adult Regular)   Pulse 72   Wt 69 kg (152 lb 3.2 oz)   SpO2 97%   Breastfeeding No   BMI 30.64 kg/m    Body mass index is 30.64 kg/m .  Physical Exam   GENERAL:  alert and no distress  EYES: Eyes grossly normal to inspection, PERRL and conjunctivae and sclerae normal  RESP: lungs clear to auscultation - no rales, rhonchi or wheezes  CV: regular rate and rhythm, normal S1 S2, no S3 or S4, no murmur  MS: no gross musculoskeletal defects noted, no edema  NEURO: CN 2-12 intact. Normal strength and tone, mentation intact and speech normal

## 2023-09-29 NOTE — PATIENT INSTRUCTIONS
Continue taking your blood pressure twice a week. Goal BP is less than 140/90. If it is continuously higher than this, please follow up sooner than 3 months.

## 2023-09-29 NOTE — LETTER
October 2, 2023      Priscila Watkins  [address]   [place] [address]        Dear Ms. Watkins,     Here are your test results:   -- Your electrolytes (sodium, potassium, etc) were normal.  That's good.   -- Your kidney function was normal.   -- Your cholesterol was high.  I want you to take a cholesterol medicine.  I will send it to your pharmacy.  One pill a day.  This lowers the risk of cholesterol plugging the blood vessels in the heart and brain, which can lead to heart attack or stroke.     Take care,     Lainey Tracy MD     Resulted Orders   Basic metabolic panel   Result Value Ref Range    Sodium 141 135 - 145 mmol/L      Comment:      Reference intervals for this test were updated on 09/26/2023 to more accurately reflect our healthy population. There may be differences in the flagging of prior results with similar values performed with this method. Interpretation of those prior results can be made in the context of the updated reference intervals.     Potassium 3.7 3.4 - 5.3 mmol/L    Chloride 103 98 - 107 mmol/L    Carbon Dioxide (CO2) 23 22 - 29 mmol/L    Anion Gap 15 7 - 15 mmol/L    Urea Nitrogen 11.6 8.0 - 23.0 mg/dL    Creatinine 0.69 0.51 - 0.95 mg/dL    GFR Estimate >90 >60 mL/min/1.73m2    Calcium 9.7 8.8 - 10.2 mg/dL    Glucose 106 (H) 70 - 99 mg/dL   Lipid panel   Result Value Ref Range    Cholesterol 241 (H) <200 mg/dL    Triglycerides 480 (H) <150 mg/dL    Direct Measure HDL 36 (L) >=50 mg/dL    LDL Cholesterol Calculated        Comment:      Cannot estimate LDL when triglyceride exceeds 400 mg/dL    Non HDL Cholesterol 205 (H) <130 mg/dL    Narrative    Cholesterol  Desirable:  <200 mg/dL    Triglycerides  Normal:  Less than 150 mg/dL  Borderline High:  150-199 mg/dL  High:  200-499 mg/dL  Very High:  Greater than or equal to 500 mg/dL    Direct Measure HDL  Female:  Greater than or equal to 50 mg/dL   Male:  Greater than or equal to 40 mg/dL    LDL Cholesterol  Desirable:   <100mg/dL  Above Desirable:  100-129 mg/dL   Borderline High:  130-159 mg/dL   High:  160-189 mg/dL   Very High:  >= 190 mg/dL    Non HDL Cholesterol  Desirable:  130 mg/dL  Above Desirable:  130-159 mg/dL  Borderline High:  160-189 mg/dL  High:  190-219 mg/dL  Very High:  Greater than or equal to 220 mg/dL   HEMOGLOBIN A1C   Result Value Ref Range    Hemoglobin A1C 5.8 (H) 0.0 - 5.6 %      Comment:      Normal <5.7%   Prediabetes 5.7-6.4%    Diabetes 6.5% or higher     Note: Adopted from ADA consensus guidelines.       If you have any questions or concerns, please call the clinic at the number listed above.       Sincerely,      Lainey Tracy MD

## 2023-10-01 DIAGNOSIS — E78.5 HYPERLIPIDEMIA LDL GOAL <100: Primary | ICD-10-CM

## 2023-10-01 RX ORDER — ROSUVASTATIN CALCIUM 20 MG/1
20 TABLET, COATED ORAL DAILY
Qty: 90 TABLET | Refills: 3 | Status: SHIPPED | OUTPATIENT
Start: 2023-10-01 | End: 2024-05-17

## 2023-10-02 NOTE — RESULT ENCOUNTER NOTE
Results for orders placed or performed in visit on 09/29/23  -Basic metabolic panel:      Status: Abnormal       Result                                            Value                         Ref Range                       Sodium                                            141                           135 - 145 mmol/L                Potassium                                         3.7                           3.4 - 5.3 mmol/L                Chloride                                          103                           98 - 107 mmol/L                 Carbon Dioxide (CO2)                              23                            22 - 29 mmol/L                  Anion Gap                                         15                            7 - 15 mmol/L                   Urea Nitrogen                                     11.6                          8.0 - 23.0 mg/dL                Creatinine                                        0.69                          0.51 - 0.95 mg/dL               GFR Estimate                                      >90                           >60 mL/min/1.73m2               Calcium                                           9.7                           8.8 - 10.2 mg/dL                Glucose                                           106 (H)                       70 - 99 mg/dL              -Lipid panel:      Status: Abnormal       Result                                            Value                         Ref Range                       Cholesterol                                       241 (H)                       <200 mg/dL                      Triglycerides                                     480 (H)                       <150 mg/dL                      Direct Measure HDL                                36 (L)                        >=50 mg/dL                      LDL Cholesterol Calculated                                                                                      Non HDL  Cholesterol                               205 (H)                       <130 mg/dL                     Narrative    Cholesterol    Desirable:  <200 mg/dL        Triglycerides    Normal:  Less than 150 mg/dL    Borderline High:  150-199 mg/dL    High:  200-499 mg/dL    Very High:  Greater than or equal to 500 mg/dL        Direct Measure HDL    Female:  Greater than or equal to 50 mg/dL     Male:  Greater than or equal to 40 mg/dL        LDL Cholesterol    Desirable:  <100mg/dL    Above Desirable:  100-129 mg/dL     Borderline High:  130-159 mg/dL     High:  160-189 mg/dL     Very High:  >= 190 mg/dL        Non HDL Cholesterol    Desirable:  130 mg/dL    Above Desirable:  130-159 mg/dL    Borderline High:  160-189 mg/dL    High:  190-219 mg/dL    Very High:  Greater than or equal to 220 mg/dL  -HEMOGLOBIN A1C:      Status: Abnormal       Result                                            Value                         Ref Range                       Hemoglobin A1C                                    5.8 (H)                       0.0 - 5.6 %                  The 10-year ASCVD risk score (Elvia JOHNSON, et al., 2019) is: 13.6%    Values used to calculate the score:      Age: 68 years      Sex: Female      Is Non- : No      Diabetic: No      Tobacco smoker: No      Systolic Blood Pressure: 135 mmHg      Is BP treated: Yes      HDL Cholesterol: 36 mg/dL      Total Cholesterol: 241 mg/dL

## 2023-10-04 LAB — NONINV COLON CA DNA+OCC BLD SCRN STL QL: NEGATIVE

## 2023-10-19 ENCOUNTER — ANCILLARY PROCEDURE (OUTPATIENT)
Dept: MAMMOGRAPHY | Facility: CLINIC | Age: 68
End: 2023-10-19
Attending: FAMILY MEDICINE
Payer: COMMERCIAL

## 2023-10-19 ENCOUNTER — ANCILLARY PROCEDURE (OUTPATIENT)
Dept: BONE DENSITY | Facility: CLINIC | Age: 68
End: 2023-10-19
Attending: FAMILY MEDICINE
Payer: COMMERCIAL

## 2023-10-19 DIAGNOSIS — Z00.00 ENCOUNTER FOR MEDICARE ANNUAL WELLNESS EXAM: ICD-10-CM

## 2023-10-19 DIAGNOSIS — Z12.31 VISIT FOR SCREENING MAMMOGRAM: ICD-10-CM

## 2023-10-19 PROCEDURE — 77067 SCR MAMMO BI INCL CAD: CPT | Mod: TC | Performed by: RADIOLOGY

## 2023-10-19 PROCEDURE — 77080 DXA BONE DENSITY AXIAL: CPT | Mod: TC | Performed by: RADIOLOGY

## 2023-10-26 NOTE — RESULT ENCOUNTER NOTE
Discussed by phone.  BI-RAD 1.  Repeat in 1 year.    Results for orders placed or performed in visit on 10/19/23  -DEXA HIP/PELVIS/SPINE - Future:      Status: None      Narrative    EXAM: DX HIP/PELVIS/SPINE    LOCATION: [facility]    DATE: 10/19/2023        INDICATION: Encounter for Medicare annual wellness exam.    DEMOGRAPHICS: Age- 68 years. Gender- Female.    COMPARISON: No prior studies available on the current scanner.    TECHNIQUE: Dual-energy x-ray absorptiometry (DXA) performed with routine technique.        FINDINGS:        DXA RESULTS    -Lumbar Spine: L1-L4: BMD: 1.034 g/cm2. T-score: -1.2. Z-score: 0.3.    -RIGHT Hip Total: BMD: 0.703 g/cm2. T-score: -2.4. Z-score: -1.2.    -RIGHT Hip Femoral neck: BMD: 0.664 g/cm2. T-score: -2.7. Z-score: -1.2.    -LEFT Hip Total: BMD: 0.798 g/cm2. T-score: -1.7. Z-score: -0.4.    -LEFT Hip Femoral neck: BMD: 0.753 g/cm2. T-score: -2.0. Z-score: -0.5.            WHO T-SCORE CRITERIA    -Normal: T-score at or above -1 SD    -Osteopenia: T-score between -1 and -2.5 SD    -Osteoporosis: T-score at or below -2.5 SD        The World Health Organization (WHO) criteria is applicable to perimenopausal females, postmenopausal females, and men aged 50 years or older.        FRACTURE RISK    -The FRAX risk calculator is not applicable due to osteoporosis.        RECOMMENDATIONS    The patient's BMD is consistent with osteoporosis, and he/she is at increased fracture risk. If not currently being treated for low BMD, this would merit treatment according to the Bone Health and Osteoporosis Foundation.          Impression    IMPRESSION: OSTEOPOROSIS. T-score meets the WHO criteria for osteoporosis at one or more measured sites. The risk of osteoporotic fracture increases approximately two-fold for each standard deviation decrease in T-score.   Results for orders placed or performed in visit on 10/19/23  -MA SCREENING DIGITAL BILAT - Future  (s+30):      Status:  None      Narrative    BILATERAL FULL FIELD DIGITAL SCREENING MAMMOGRAM        Performed on: 10/19/23        Compared to: 03/27/2019, 03/26/2018, 03/23/2017, and 11/17/2008        Technique: This study was evaluated with the assistance of Computer-Aided     Detection.        Findings: The breasts have scattered areas of fibroglandular density.      There is no radiographic evidence of malignancy.       Impression    IMPRESSION: ACR BI-RADS Category 1: Negative        RECOMMENDED FOLLOW-UP: Annual routine screening mammogram        The results and recommendations of this examination will be communicated     to the patient.

## 2023-10-31 ENCOUNTER — TELEPHONE (OUTPATIENT)
Dept: FAMILY MEDICINE | Facility: CLINIC | Age: 68
End: 2023-10-31
Payer: COMMERCIAL

## 2023-10-31 DIAGNOSIS — M81.0 AGE-RELATED OSTEOPOROSIS WITHOUT CURRENT PATHOLOGICAL FRACTURE: Primary | ICD-10-CM

## 2023-10-31 NOTE — TELEPHONE ENCOUNTER
"[facility] phone call message- medication clarification/question:    Full Medication Name:    Bone medicine?      Question:    It was recommended by Dr. Tracy for pt to take \"bone medicine\". Pt has now agreed.     Needing a call back.     Pharmacy confirmed as      [id number] - [place], [place] - [address] AT [facility] [address]: Yes    OK to leave a message on voice mail? Yes    Primary language: Swazi      needed? Yes    Call taken on October 31, 2023 at 10:31 AM by Carlotta Castro  "

## 2023-11-03 PROBLEM — M81.0 AGE-RELATED OSTEOPOROSIS WITHOUT CURRENT PATHOLOGICAL FRACTURE: Status: ACTIVE | Noted: 2023-11-03

## 2023-11-03 RX ORDER — ALENDRONATE SODIUM 70 MG/1
70 TABLET ORAL
Qty: 12 TABLET | Refills: 3 | Status: SHIPPED | OUTPATIENT
Start: 2023-11-03 | End: 2024-05-17

## 2024-05-17 ENCOUNTER — OFFICE VISIT (OUTPATIENT)
Dept: FAMILY MEDICINE | Facility: CLINIC | Age: 69
End: 2024-05-17
Payer: COMMERCIAL

## 2024-05-17 VITALS
OXYGEN SATURATION: 97 % | BODY MASS INDEX: 30.44 KG/M2 | WEIGHT: 151 LBS | TEMPERATURE: 97.4 F | DIASTOLIC BLOOD PRESSURE: 79 MMHG | RESPIRATION RATE: 18 BRPM | SYSTOLIC BLOOD PRESSURE: 128 MMHG | HEIGHT: 59 IN | HEART RATE: 69 BPM

## 2024-05-17 DIAGNOSIS — M25.50 MULTIPLE JOINT PAIN: ICD-10-CM

## 2024-05-17 DIAGNOSIS — M81.0 AGE-RELATED OSTEOPOROSIS WITHOUT CURRENT PATHOLOGICAL FRACTURE: ICD-10-CM

## 2024-05-17 DIAGNOSIS — E78.5 HYPERLIPIDEMIA LDL GOAL <100: ICD-10-CM

## 2024-05-17 DIAGNOSIS — I10 PRIMARY HYPERTENSION: ICD-10-CM

## 2024-05-17 PROCEDURE — 99213 OFFICE O/P EST LOW 20 MIN: CPT | Mod: 25 | Performed by: FAMILY MEDICINE

## 2024-05-17 PROCEDURE — 91320 SARSCV2 VAC 30MCG TRS-SUC IM: CPT | Performed by: FAMILY MEDICINE

## 2024-05-17 PROCEDURE — 90480 ADMN SARSCOV2 VAC 1/ONLY CMP: CPT | Performed by: FAMILY MEDICINE

## 2024-05-17 RX ORDER — LOSARTAN POTASSIUM 25 MG/1
25 TABLET ORAL DAILY
Qty: 90 TABLET | Refills: 3 | Status: SHIPPED | OUTPATIENT
Start: 2024-05-17

## 2024-05-17 RX ORDER — ALENDRONATE SODIUM 70 MG/1
70 TABLET ORAL
Qty: 12 TABLET | Refills: 3 | Status: SHIPPED | OUTPATIENT
Start: 2024-05-17 | End: 2024-07-26

## 2024-05-17 RX ORDER — NAPROXEN 250 MG/1
250 TABLET ORAL 2 TIMES DAILY PRN
Qty: 60 TABLET | Refills: 1 | Status: SHIPPED | OUTPATIENT
Start: 2024-05-17 | End: 2024-06-03

## 2024-05-17 RX ORDER — ROSUVASTATIN CALCIUM 20 MG/1
20 TABLET, COATED ORAL DAILY
Qty: 90 TABLET | Refills: 3 | Status: SHIPPED | OUTPATIENT
Start: 2024-05-17 | End: 2024-07-26

## 2024-05-17 SDOH — HEALTH STABILITY: PHYSICAL HEALTH: ON AVERAGE, HOW MANY DAYS PER WEEK DO YOU ENGAGE IN MODERATE TO STRENUOUS EXERCISE (LIKE A BRISK WALK)?: 3 DAYS

## 2024-05-17 ASSESSMENT — SOCIAL DETERMINANTS OF HEALTH (SDOH): HOW OFTEN DO YOU GET TOGETHER WITH FRIENDS OR RELATIVES?: TWICE A WEEK

## 2024-05-17 NOTE — COMMUNITY RESOURCES LIST (PATIENT PREFERRED LANGUAGE)
May 17, 2024           ?????????????????????????????????? ??????????????????           ????????????? ????????    ??????????????      [facility] Access - Emergency housing  [address] [place] [address] (????????: 3.0 ?????)  ????????: ???????????  ????: ?????      [facility] Access - Coordinated Entry access point  [address] [place] [address] (????????: 3.0 ?????)  ?????: [phone]  ????????: ???????????  ????: ?????      [facility]  ?????: [phone]  ????????: [email or web address]  ????????: ?????????? ???? ?????????? ???????? ?????  ????: ??? 9:00 AM - 5:00 PM ??? 9:00 AM - 5:00 PM ??? 9:00 AM - 5:00 PM ??? 9:00 AM - 5:00 PM ??? 9:00 AM - 5:00 PM  ????: ?????  ???????????????: ??????????????????????? ???????? ????????? ????????? ???????????????????????  ????????????????? ???????????????: ??????????????????????    ????????????????????      County - Coordinated Access  [address] [place] [address] (????????: 3.0 ?????)  ?????: [phone]  ????????: ???????????  ????: ?????  ???????????????: ???????????????????????? Ada ???? ?????????????????????????      . [facility] - Housing search assistance  [address] [place] [address] (????????: 2.8 ?????)  ?????: [phone]  ????????: ?????????? ????????? ?????? ???  ????: ?????  ???????????????: Ada ??? ????????????????? ??????????????????????? ???????? ????????? ????????? ???????????????????????      [facility] - Housing Stabilization Services  ?????: [phone]  ????????: [email or web address]  ????????: ???????????  ????: ??? 8:00AM - 4:00PM ??? 8:00AM - 4:00PM ??? 8:00AM - 4:00PM ??? 8:00AM - 4:00PM ??? 8:00AM - 4:00PM  ????: ?????  ???????????????: ??????????????????????????? ????????????? ????????? ??????????????  ????????????????? ???????????????: ??????????????????????    ??????????????  ???????????????      [facility] - [facility]  [address] [place] [address] (????????: 1.6 ?????)  ?????: [phone]  ????????: ???????????  ????: ?????      [facility] [place] - Elder Independent Living Program  [address] [place] [address] (????????: 8.6 ?????)  ????????: [email or web address]  ????????: ???????????      LOVE - LAUNDRY LOVE  ????????: [email or web address]               ??????????????????????? ????????????        ???????????????????????  ???  .   United Way ?  ??? [email or web address]://211unBenkyo [email or web address].org  .   ??????????????????  [phone] 309-8669 http://[place]poison.org http://wisconsinpoison.org  .     Suicide ????? Crisis Lifeline  988 http://988lifeline.org  .   Childhelp [place] Child Abuse Hotline  354.435.1078 http://Childhelphotline.org   .   ????????? ????????????????? ?????????????????? ??????????  (715) 403-3256 (HOPE) http://Rainn.org   .     National Runaway Safeline  (229) 649-3642 (RUNAWAY) http://[place] Centerrunaway.org  .   ???????????????????????????????????????  ??????????/????? [phone] [place]- http://ppsupportmn.org [place]- http://psichapters.com/wi  .   ???????????? ?????????????? ????????? ???????????? (SAMHSA)  678-485-HELP (1787) http://Findtreatment.gov   .                ????????????- ?????????????????? Unite Us Platform ????????? ????????????????? Unite Us ??? ??????????????????? ???????????? ???????????????????????????? ??????????????????? Unite Us ??? ??????????????????????????????????????????????????? ????????????????????? ????????? ?????????????? ????????? ????????????? ?????????????? ??????????????    UTC

## 2024-05-17 NOTE — COMMUNITY RESOURCES LIST (PATIENT PREFERRED LANGUAGE)
May 17, 2024           ?????????????????????????????????? ??????????????????           ????????????? ????????    ??????????????      [facility] Access - Emergency housing  [address] [place] [address] (????????: 3.0 ?????)  ????????: ???????????  ????: ?????      [facility] Access - Coordinated Entry access point  [address] [place] [address] (????????: 3.0 ?????)  ?????: [phone]  ????????: ???????????  ????: ?????      [facility]  ?????: [phone]  ????????: [email or web address]  ????????: ?????????? ???? ?????????? ???????? ?????  ????: ??? 9:00 AM - 5:00 PM ??? 9:00 AM - 5:00 PM ??? 9:00 AM - 5:00 PM ??? 9:00 AM - 5:00 PM ??? 9:00 AM - 5:00 PM  ????: ?????  ???????????????: ??????????????????????? ???????? ????????? ????????? ???????????????????????  ????????????????? ???????????????: ??????????????????????    ????????????????????      County - Coordinated Access  [address] [place] [address] (????????: 3.0 ?????)  ?????: [phone]  ????????: ???????????  ????: ?????  ???????????????: ???????????????????????? Ada ???? ?????????????????????????      . [facility] - Housing search assistance  [address] [place] [address] (????????: 2.8 ?????)  ?????: [phone]  ????????: ?????????? ????????? ?????? ???  ????: ?????  ???????????????: Ada ??? ????????????????? ??????????????????????? ???????? ????????? ????????? ???????????????????????      [facility] - Housing Stabilization Services  ?????: [phone]  ????????: [email or web address]  ????????: ???????????  ????: ??? 8:00AM - 4:00PM ??? 8:00AM - 4:00PM ??? 8:00AM - 4:00PM ??? 8:00AM - 4:00PM ??? 8:00AM - 4:00PM  ????: ?????  ???????????????: ??????????????????????????? ????????????? ????????? ??????????????  ????????????????? ???????????????: ??????????????????????    ??????????????  ???????????????      Incorporated - [facility] Recovery  [address] [facility] [place] [address] (????????: 1.6 ?????)  ?????: [phone]  ????????: ???????????  ????: ?????      [facility] [employer] [facility] [place] - Elder Independent Living Program  [address] [place] [address] (????????: 8.6 ?????)  ????????: [email or web address]  ????????: ???????????      LOVE - LAUNDRY LOVE  ????????: [email or web address]               ??????????????????????? ????????????        ???????????????????????  ???  .   United Way ?  ??? http://[email or web address].org  .   ??????????????????  [phone]) [phone] http://mnpoison.org http://wisconsinpoison.org  .     Suicide ????? Crisis Lifeline  988 http://988lifeline.org  .   Childhelp [place] Child Abuse Hotline  477.555.1617 http://Childhelphotline.org   .   ????????? ????????????????? ?????????????????? ??????????  [phone]) 895-2512 (HOPE) http://Rainn.org   .     National Runaway Safeline  (885) 794-6605 (RUNAWAY) http://[facility] Countyrunaway.org  .   ???????????????????????????????????????  ??????????/????? [phone] [place]- http://ppsupportmn.org [place]- http://psichapters.com/wi  .   ???????????? ?????????????? ????????? ???????????? (SAMHSA)  730-200-?????? (4357) http://Findtreatment.gov   .                ????????????- ?????????????????? Unite Us Platform ????????? ????????????????? Unite Us ??? ??????????????????? ???????????? ???????????????????????????? ??????????????????? Unite Us ??? ??????????????????????????????????????????????????? ????????????????????? ????????? ?????????????? ????????? ????????????? ?????????????? ??????????????    UTC

## 2024-05-17 NOTE — PROGRESS NOTES
Assessment & Plan   Emergency Department follow-up, presented for dizziness.  Work-up was benign, and she recalls being told that the issue was high BP.  Symptoms sound vertiginous, and self-resolved without changes to BP management.  BP currently well-controlled on home measurements and 128/79 in the office today, on losartan 25.  -- Continue losartan 25.  If she wants to try taking a double-dose (losartan 50) and monitoring her BP, she can see which she feels more comfortable with.    Healthcare maintenance.  COVID booster today.    Return to clinic at convenience for Annual Medicare Wellness Visit.  Future Appointments   Date Time Provider Department Center   7/26/2024  1:10 PM Lainey Tracy MD [facility]   Priscila Watkins is a 68 year old female with a history including prediabetes and HTN who presents for Emergency Department follow-up.    She presented to [facility] ED a little over a month ago (3/29/2024) for dizziness.  Work-up (including BMP, CBC, troponin, chest x-ray, and EKG) was unremarkable, so she was discharged to home with recommendations for improved blood pressure control.  Her current blood pressure regimen is losartan 25 mg daily.  [I also reviewed her labs, and they showed a slight anemia, with Hgb 11.9, though this is similar to her baseline going back years.]    Today, he recalls visiting the Emergency Department on 3/29/24 due to a sudden onset of dizziness. She described the dizziness as a spinning sensation accompanied by difficulty seeing clearly. She was unsure about her balance at the time because she was in bed when the symptoms began and didn't feel comfortable getting out of bed. She called her daughter and subsequently went to the ER, where she was informed that her blood pressure was high. Since then, her symptoms have resolved. She has been checking her blood pressure at home daily, with systolic readings ranging from 112 to 130, averaging around 125.    Social:  "She reports that she has never smoked. She has never used smokeless tobacco.    Objective   Vitals: /79   Pulse 69   Temp 97.4  F (36.3  C) (Tympanic)   Resp 18   Ht 1.489 m (4' 10.63\")   Wt 68.5 kg (151 lb)   SpO2 97%   BMI 30.88 kg/m    General: Pleasant. Older woman. No distress.  Heart: Regular rate and rhythm. No murmurs, rubs, or gallops.  Lungs: Clear to auscultation bilaterally. No wheezes or crackles. Good air movement.  Neuro: Alert and oriented x3. CN II-XII intact.  Psych: Appropriate grooming and hygiene. Speech normal rate. Appropriate mood and affect.      Labs Reviewed                        "

## 2024-05-17 NOTE — COMMUNITY RESOURCES LIST (PATIENT PREFERRED LANGUAGE)
May 17, 2024           ?????????????????????????????????? ??????????????????           ????????????? ????????    ??????????????      [facility] - [facility] Access - Emergency housing  [address] [place] [address] (????????: 3.0 ?????)  ????????: ???????????  ????: ?????      [facility] - [facility] Access - Coordinated Entry access point  [address] [place] [address] (????????: 3.0 ?????)  ?????: [phone]  ????????: ???????????  ????: ?????      [facility]  ?????: [phone]  ????????: [email or web address]  ????????: ?????????? ???? ?????????? ???????? ?????  ????: ??? 9:00 AM - 5:00 PM ??? 9:00 AM - 5:00 PM ??? 9:00 AM - 5:00 PM ??? 9:00 AM - 5:00 PM ??? 9:00 AM - 5:00 PM  ????: ?????  ???????????????: ??????????????????????? ???????? ????????? ????????? ???????????????????????  ????????????????? ???????????????: ??????????????????????    ????????????????????      County - Coordinated Access  [address] [place] [address] (????????: 3.0 ?????)  ?????: [phone]  ????????: ???????????  ????: ?????  ???????????????: ???????????????????????? Ada ???? ?????????????????????????      . [facility] - Housing search assistance  [address] [place] [address] (????????: 2.8 ?????)  ?????: [phone]  ????????: ?????????? ????????? ?????? ???  ????: ?????  ???????????????: Ada ??? ????????????????? ??????????????????????? ???????? ????????? ????????? ???????????????????????      [employer] - Housing Stabilization Services  ?????: [phone]  ????????: [email or web address]  ????????: ???????????  ????: ??? 8:00AM - 4:00PM ??? 8:00AM - 4:00PM ??? 8:00AM - 4:00PM ??? 8:00AM - 4:00PM ??? 8:00AM - 4:00PM  ????: ?????  ???????????????: ??????????????????????????? ????????????? ????????? ??????????????  ????????????????? ???????????????: ??????????????????????    ??????????????  ???????????????      [facility]ated - [facility] Recovery  [address] [place] [address] (????????: 1.6 ?????)  ?????: [phone]  ????????: ???????????  ????: ?????      [facility] [place] - Elder Independent Living Program  [address] [place] [address] (????????: 8.6 ?????)  ????????: [email or web address]  ????????: ???????????      LOVE - LAUNDRY LOVE  ????????: [email or web address]               ??????????????????????? ????????????        ???????????????????????  ???  .   United Way ?  ??? [email or web address]://[email or web address]  .   ??????????????????  [phone] 756-3653 http://mnpoison.org http://wisconsinpoison.org  .     Suicide ????? Crisis Lifeline  988 http://988lifeline.org  .   Childhelp [place] Child Abuse Hotline  611.503.6607 http://Childhelphotline.org   .   ????????? ????????????????? ?????????????????? ??????????  (894) 308-1400 (HOPE) http://Rainn.org   .     National Runaway Safeline  (788) 181-2845 (RUNAWAY) http://[facility]away.org  .   ???????????????????????????????????????  ??????????/????? [phone] [place]- http://ppsupportmn.org [place]- http://psichapters.com/wi  .   ???????????? ?????????????? ????????? ???????????? (SAMHSA)  638-042-HELP (0907) http://Findtreatment.gov   .                ????????????- ?????????????????? Unite Us Platform ????????? ????????????????? Unite Us ??? ??????????????????? ???????????? ???????????????????????????? ??????????????????? Unite Us ??? ??????????????????????????????????????????????????? ????????????????????? ????????? ?????????????? ????????? ????????????? ?????????????? ??????????????    UTC

## 2024-05-17 NOTE — COMMUNITY RESOURCES LIST (ENGLISH)
May 17, 2024           YOUR PERSONALIZED LIST OF SERVICES & PROGRAMS           & SHELTER    Housing      [facility] Access - Emergency housing  [address] [place] [address] (Distance: 3.0 miles)  Language: English  Fee: Free      [place] - [place] - Coordinated Access - Coordinated Entry access point  [address] [place] [address] (Distance: 3.0 miles)  Phone: [phone]  Language: English  Fee: Free      [facility] - [employer] [facility]  Phone: [phone]  Website: [email or web address]  Language: English, Hmong, Oromo, Nigerien, Citizen of [place]  Hours: Mon 9:00 AM - 5:00 PM Tue 9:00 AM - 5:00 PM Wed 9:00 AM - 5:00 PM Thu 9:00 AM - 5:00 PM Fri 9:00 AM - 5:00 PM  Fee: Insurance  Accessibility: Blind accommodation, Deaf or hard of hearing, Translation services  Transportation Options: Free transportation    Case Management      [address] [address] (Distance: 3.0 miles)  Phone: [phone]  Language: English  Fee: Free  Accessibility: Translation services, Ada accessible      [facility] - Housing search assistance  [address] [place] [address] (Distance: 2.8 miles)  Phone: [phone]  Language: English, Irish, Curt, Hmong  Fee: Free  Accessibility: Ada accessible, Blind accommodation, Deaf or hard of hearing, Translation services      [employer] - Housing Stabilization Services  Phone: [phone]  Website: https://homebasemn.com/  Language: English  Hours: Mon 8:00 AM - 4:00 PM Tue 8:00 AM - 4:00 PM Wed 8:00 AM - 4:00 PM Thu 8:00 AM - 4:00 PM Fri 8:00 AM - 4:00 PM  Fee: Free  Accessibility: Blind accommodation, Deaf or hard of hearing  Transportation Options: Free transportation    Drop-In Services      [employer]  [address] [place] [address] [address] [place] [address] (Distance: 1.6 miles)  Phone: [phone]  Language: English  Fee:  Free      [facility] - [facility]  [address] [place] [address] (Distance: 8.6 miles)  Website: https://www.amn.info/elders  Language: English      [employer] LA[facility]DRY LOVE  Website: http://www.laundrylove.org               IMPORTANT NUMBERS & WEBSITES        Emergency Services  911  .   United Way  211 http://211unitedway.org  .   Poison Control  (641) 139-2873 http://[place]poison.org http://wisconsinpoison.org  .     Suicide and Crisis Lifeline  988 http://988NeuroGenetic Pharmaceuticalsline.org  .   Childhelp [place] Child Abuse Hotline  850.897.2482 http://Childhelphotline.org   .   National Sexual Assault Hotline  (992) 952-8967 (HOPE) http://Axis Semiconductorn.org   .     National Runaway Safeline  (767) 842-5314 (RUNAWAY) http://Trema Group.aXess america  .   Pregnancy & Postpartum Support  Call/text 308-694-8378  [place]: http://ppsupportmn.org  [place]: http://psichapters.com/wi  .   Substance Abuse National Helpline ([facility])  859-712-HELP (9088) http://Findtreatment.gov   .                DISCLAIMER: These resources have been generated via the Xplornet Platform. Xplornet does not endorse any service providers mentioned in this resource list. Xplornet does not guarantee that the services mentioned in this resource list will be available to you or will improve your health or wellness.    [facility]

## 2024-05-17 NOTE — COMMUNITY RESOURCES LIST (ENGLISH)
May 17, 2024           YOUR PERSONALIZED LIST OF SERVICES & PROGRAMS           & SHELTER    Housing      [facility] Access - Emergency housing  [address] [place] [address] (Distance: 3.0 miles)  Language: English  Fee: Free      [place] - Coordinated Access - Coordinated Entry access point  [address] [place] [address] (Distance: 3.0 miles)  Phone: [phone]  Language: English  Fee: Free      [employer] [facility] Care [facility] - [employer] [facility]  Phone: [phone]  Website: https://www.[facility]/  Language: English, Hmong, Oromo, East Timorese, Malawian  Hours: Mon 9:00 AM - 5:00 PM Tue 9:00 AM - 5:00 PM Wed 9:00 AM - 5:00 PM Thu 9:00 AM - 5:00 PM Fri 9:00 AM - 5:00 PM  Fee: Insurance  Accessibility: Blind accommodation, Deaf or hard of hearing, Translation services  Transportation Options: Free transportation    Case Management      [address] [address] (Distance: 3.0 miles)  Phone: [phone]  Language: English  Fee: Free  Accessibility: Translation services, Ada accessible      [facility] - Housing search assistance  [address] [place] [address] (Distance: 2.8 miles)  Phone: [phone]  Language: English, Mohawk, Curt, Hmong  Fee: Free  Accessibility: Ada accessible, Blind accommodation, Deaf or hard of hearing, Translation services      [employer] - Housing Stabilization Services  Phone: [phone]  Website: https://homebasemn.com/  Language: English  Hours: Mon 8:00 AM - 4:00 PM Tue 8:00 AM - 4:00 PM Wed 8:00 AM - 4:00 PM Thu 8:00 AM - 4:00 PM Fri 8:00 AM - 4:00 PM  Fee: Free  Accessibility: Blind accommodation, Deaf or hard of hearing  Transportation Options: Free transportation    Drop-In Services      [employer] - [employer]  [address] [place] [address] [address] [place] [address] (Distance: 1.6 miles)  Phone: [phone]  Language: English  Fee:  Free      [facility] - [facility] Living [facility]  [address] [place] [address] (Distance: 8.6 miles)  Website: https://www.amn.info/elders  Language: English      [employer] - LAUNDRY LOVE  Website: http://www.laundrylove.org               IMPORTANT NUMBERS & WEBSITES        Emergency Services  911  .   United Way  211 http://211unitedway.org  .   Poison Control  (819) 965-1770 http://mnpoison.org http://wisconsinpoison.org  .     Suicide and Crisis Lifeline  988 http://988Augurline.org  .   Childhelp [place] Child Abuse Hotline  671.885.4772 http://Childhelphotline.org   .   National Sexual Assault Hotline  (208) 277-4694 (HOPE) http://KidoZenn.org   .     National Runaway Safeline  (969) 118-6369 (RUNAWAY) http://Utrip.DOCUSYS  .   Pregnancy & Postpartum Support  Call/text 170-755-1257  [place]: http://ppsupportmn.org  [place]: http://psichapters.com/wi  .   Substance Abuse National Helpline ([facility])  225-531-HELP (5556) http://Findtreatment.gov   .                DISCLAIMER: These resources have been generated via the XOJET Platform. XOJET does not endorse any service providers mentioned in this resource list. XOJET does not guarantee that the services mentioned in this resource list will be available to you or will improve your health or wellness.    [facility]

## 2024-06-03 ENCOUNTER — OFFICE VISIT (OUTPATIENT)
Dept: FAMILY MEDICINE | Facility: CLINIC | Age: 69
End: 2024-06-03
Payer: COMMERCIAL

## 2024-06-03 VITALS
HEART RATE: 75 BPM | WEIGHT: 153.2 LBS | OXYGEN SATURATION: 98 % | HEIGHT: 59 IN | DIASTOLIC BLOOD PRESSURE: 91 MMHG | TEMPERATURE: 98.2 F | RESPIRATION RATE: 18 BRPM | BODY MASS INDEX: 30.88 KG/M2 | SYSTOLIC BLOOD PRESSURE: 169 MMHG

## 2024-06-03 DIAGNOSIS — M17.10 ARTHRITIS OF KNEE: ICD-10-CM

## 2024-06-03 DIAGNOSIS — I10 BENIGN ESSENTIAL HYPERTENSION: Primary | ICD-10-CM

## 2024-06-03 LAB
ANION GAP SERPL CALCULATED.3IONS-SCNC: 10 MMOL/L (ref 7–15)
BUN SERPL-MCNC: 5.6 MG/DL (ref 8–23)
CALCIUM SERPL-MCNC: 9 MG/DL (ref 8.8–10.2)
CHLORIDE SERPL-SCNC: 106 MMOL/L (ref 98–107)
CREAT SERPL-MCNC: 0.68 MG/DL (ref 0.51–0.95)
DEPRECATED HCO3 PLAS-SCNC: 25 MMOL/L (ref 22–29)
EGFRCR SERPLBLD CKD-EPI 2021: >90 ML/MIN/1.73M2
GLUCOSE SERPL-MCNC: 92 MG/DL (ref 70–99)
POTASSIUM SERPL-SCNC: 4 MMOL/L (ref 3.4–5.3)
SODIUM SERPL-SCNC: 141 MMOL/L (ref 135–145)

## 2024-06-03 PROCEDURE — 36415 COLL VENOUS BLD VENIPUNCTURE: CPT | Performed by: STUDENT IN AN ORGANIZED HEALTH CARE EDUCATION/TRAINING PROGRAM

## 2024-06-03 PROCEDURE — 80048 BASIC METABOLIC PNL TOTAL CA: CPT | Performed by: STUDENT IN AN ORGANIZED HEALTH CARE EDUCATION/TRAINING PROGRAM

## 2024-06-03 PROCEDURE — 99214 OFFICE O/P EST MOD 30 MIN: CPT | Performed by: STUDENT IN AN ORGANIZED HEALTH CARE EDUCATION/TRAINING PROGRAM

## 2024-06-03 RX ORDER — ACETAMINOPHEN 325 MG/1
650 TABLET ORAL EVERY 8 HOURS PRN
Qty: 90 TABLET | Refills: 0 | Status: SHIPPED | OUTPATIENT
Start: 2024-06-03 | End: 2024-06-18

## 2024-06-03 NOTE — PROGRESS NOTES
"  Assessment & Plan     Benign essential hypertension  Patient's blood pressure is uncontrolled. May be secondary to coadministration of ARB + NSAID, or may be secondary to ARB in combination with renal artery stenosis. Given the onset of increased hypertension in setting of recently restarted naproxen, will stop naproxen and trend blood pressures. Additionally, will get BMP to assess for MELISSA.  - Basic metabolic panel; Future  - Basic metabolic panel    Arthritis of knee  If acetaminophen doesn't work, could do knee injections x1 or 2.  - acetaminophen (TYLENOL) 325 MG tablet; Take 2 tablets (650 mg) by mouth every 8 hours as needed for mild pain  BMI  Estimated body mass index is 31.47 kg/m  as calculated from the following:    Height as of this encounter: 1.486 m (4' 10.5\").    Weight as of this encounter: 69.5 kg (153 lb 3.2 oz).       No follow-ups on file.    Damien Francois is a 68 year old, presenting for the following health issues:  Recheck Medication (Got same medication, ever since doses increased hasn't been feeling well.)        5/17/2024     2:57 PM   Additional Questions   Roomed by    Accompanied by daughter     Patient presenting for follow up of hypertension and dizziness. Patient reports that she has dizziness still and tends to develop the dizziness when she has higher blood pressures. Has been checking blood pressures at home.  Did not drink coffee before coming in. Does not smoke.    Feels like since she started taking the losartan her blood pressures have been increasing.    Takes Naproxen 250 mg BID for her knee pain. She stopped taking it for about a year, and then started taking it again on the 29th or so of May.    May 24 139/89  May 25 141/89  May 26 145/93  May 27 111/69  June 1 154/96  June 2 160/98  Lorraine 3 163/93    Naproxen is the thing that has worked the best for her knee pain. She is willing to try tylenol again.    No shortness of breath, no chest pain, no decrease in urine " "production        Objective    BP (!) 169/91   Pulse 75   Temp 98.2  F (36.8  C)   Resp 18   Ht 1.486 m (4' 10.5\")   Wt 69.5 kg (153 lb 3.2 oz)   SpO2 98%   BMI 31.47 kg/m    Body mass index is 31.47 kg/m .  Physical Exam  Constitutional:       General: She is not in acute distress.  Cardiovascular:      Rate and Rhythm: Normal rate and regular rhythm.      Heart sounds: No murmur heard.  Pulmonary:      Effort: Pulmonary effort is normal. No respiratory distress.   Neurological:      Mental Status: She is alert.            Signed Electronically by: Adelina Delgado MD    "

## 2024-06-18 ENCOUNTER — ANCILLARY PROCEDURE (OUTPATIENT)
Dept: GENERAL RADIOLOGY | Facility: CLINIC | Age: 69
End: 2024-06-18
Attending: FAMILY MEDICINE
Payer: COMMERCIAL

## 2024-06-18 ENCOUNTER — OFFICE VISIT (OUTPATIENT)
Dept: FAMILY MEDICINE | Facility: CLINIC | Age: 69
End: 2024-06-18
Payer: COMMERCIAL

## 2024-06-18 VITALS
BODY MASS INDEX: 30.32 KG/M2 | HEIGHT: 59 IN | RESPIRATION RATE: 20 BRPM | WEIGHT: 150.4 LBS | TEMPERATURE: 98.1 F | DIASTOLIC BLOOD PRESSURE: 80 MMHG | SYSTOLIC BLOOD PRESSURE: 130 MMHG | OXYGEN SATURATION: 99 % | HEART RATE: 74 BPM

## 2024-06-18 DIAGNOSIS — M17.10 ARTHRITIS OF KNEE: ICD-10-CM

## 2024-06-18 PROCEDURE — 99214 OFFICE O/P EST MOD 30 MIN: CPT | Performed by: FAMILY MEDICINE

## 2024-06-18 PROCEDURE — 73562 X-RAY EXAM OF KNEE 3: CPT | Mod: TC | Performed by: RADIOLOGY

## 2024-06-18 RX ORDER — ACETAMINOPHEN 325 MG/1
650 TABLET ORAL EVERY 8 HOURS PRN
Qty: 90 TABLET | Refills: 0 | Status: SHIPPED | OUTPATIENT
Start: 2024-06-18 | End: 2024-07-26

## 2024-06-18 NOTE — PROGRESS NOTES
"Assessment & Plan   Hypertension follow-up.  Well-controlled today (130/80), consistent with home measurements.  She has noticed that it is elevated with naproxen, so holding NSAIDs.  -- Continue losartan 25 mg daily.    Chronic bilateral knee pain, worse since discontinuing NSAIDs.  Updated x-rays today.  On my review, there is some evidence of mild osteoarthritis, but not severe.  Awaiting formal radiology read.  -- Ordered topical diclofenac.  -- Continue acetaminophen.    Return to clinic for wellness visit.    Subjective   Priscila Watkins is a 68 year old female with a history including prediabetes and HTN who presents for HTN follow-up.    Last time we discussed her BP because she had been in the emergency department for dizziness and was told the issue may be related to high blood pressure.  At her visit to discuss that, her blood pressure was fine, as it had been at home as well.  However, when she returned to see Dr. Delgado recently, it was elevated.  He instructed her to hold the naproxen.  Since doing that, her blood pressure has been better, often 120s-130s/80s.  This is on losartan 25 mg.    She is pleased that her blood pressure is better, but her knee pain is worse, which is why she was taking naproxen.  She has been using Tylenol, but is not as effective.  She has chronic knee pain, with most recent x-rays 5 years ago (9/20/2019).  These were notable for the left knee showing spurring along the lateral femoral condyle and trace effusion.    Social: She reports that she has never smoked. She has never used smokeless tobacco.    Objective   Vitals: /80   Pulse 74   Temp 98.1  F (36.7  C) (Tympanic)   Resp 20   Ht 1.486 m (4' 10.5\")   Wt 68.2 kg (150 lb 6.4 oz)   SpO2 99%   BMI 30.89 kg/m    General: Pleasant. Older woman. No distress.  Heart: Regular rate and rhythm. No murmurs, rubs, or gallops.  Lungs: Clear to auscultation bilaterally. No wheezes or crackles. Good air movement.  Knee: Normal " to inspection.  No effusion.  Able to bear weight.  Full ROM in extension and flexion.  Slight joint line tenderness bilaterally.  Psych: Appropriate grooming and hygiene. Speech normal rate. Appropriate mood and affect.    Bilateral knee x-rays today (6/18/24):  Per my review, there is femoral spurring bilaterally, but otherwise decent joint spacing.  Awaiting formal radiology read.    Imaging report reviewed

## 2024-06-22 NOTE — RESULT ENCOUNTER NOTE
Results for orders placed or performed in visit on 06/18/24  -XR Knee Bilateral 3 Views:      Status: None      Narrative    EXAM: XR KNEE BILATERAL 3 VIEWS    LOCATION: [facility]    DATE: 6/18/2024        INDICATION: Arthritis of knee.    COMPARISON: Radiographs from 9/24/2019.          Impression    IMPRESSION:     RIGHT KNEE: Mild osteoarthrosis of the medial compartment. Mild osteoarthrosis of the lateral compartment. Mild osteoarthrosis of the patellofemoral compartment. Moderate effusion. The findings have all mildly progressed.        LEFT KNEE: Mild osteoarthrosis of the lateral compartment, mildly progressed. Mild osteoarthrosis of the patellofemoral compartment, unchanged. Small effusion again noted. Calcified intra-articular bodies again noted.

## 2024-07-26 DIAGNOSIS — M17.10 ARTHRITIS OF KNEE: ICD-10-CM

## 2024-07-26 DIAGNOSIS — E78.5 HYPERLIPIDEMIA LDL GOAL <100: ICD-10-CM

## 2024-07-26 DIAGNOSIS — M81.0 AGE-RELATED OSTEOPOROSIS WITHOUT CURRENT PATHOLOGICAL FRACTURE: ICD-10-CM

## 2024-07-26 RX ORDER — ALENDRONATE SODIUM 70 MG/1
70 TABLET ORAL
Qty: 12 TABLET | Refills: 3 | Status: SHIPPED | OUTPATIENT
Start: 2024-07-26

## 2024-07-26 RX ORDER — ROSUVASTATIN CALCIUM 20 MG/1
20 TABLET, COATED ORAL DAILY
Qty: 90 TABLET | Refills: 3 | Status: SHIPPED | OUTPATIENT
Start: 2024-07-26

## 2024-07-26 RX ORDER — ACETAMINOPHEN 325 MG/1
650 TABLET ORAL EVERY 8 HOURS PRN
Qty: 90 TABLET | Refills: 0 | Status: SHIPPED | OUTPATIENT
Start: 2024-07-26

## 2024-11-15 ENCOUNTER — OFFICE VISIT (OUTPATIENT)
Dept: FAMILY MEDICINE | Facility: CLINIC | Age: 69
End: 2024-11-15
Payer: COMMERCIAL

## 2024-11-15 VITALS
TEMPERATURE: 99 F | BODY MASS INDEX: 30.68 KG/M2 | RESPIRATION RATE: 18 BRPM | SYSTOLIC BLOOD PRESSURE: 176 MMHG | WEIGHT: 152.2 LBS | OXYGEN SATURATION: 98 % | HEART RATE: 76 BPM | HEIGHT: 59 IN | DIASTOLIC BLOOD PRESSURE: 111 MMHG

## 2024-11-15 DIAGNOSIS — Z00.00 ROUTINE GENERAL MEDICAL EXAMINATION AT A HEALTH CARE FACILITY: Primary | ICD-10-CM

## 2024-11-15 DIAGNOSIS — E78.5 HYPERLIPIDEMIA LDL GOAL <100: ICD-10-CM

## 2024-11-15 DIAGNOSIS — I10 ESSENTIAL HYPERTENSION, BENIGN: ICD-10-CM

## 2024-11-15 DIAGNOSIS — M17.10 ARTHRITIS OF KNEE: ICD-10-CM

## 2024-11-15 DIAGNOSIS — H91.93 BILATERAL HEARING LOSS, UNSPECIFIED HEARING LOSS TYPE: ICD-10-CM

## 2024-11-15 DIAGNOSIS — R73.03 PREDIABETES: ICD-10-CM

## 2024-11-15 DIAGNOSIS — M81.0 AGE-RELATED OSTEOPOROSIS WITHOUT CURRENT PATHOLOGICAL FRACTURE: ICD-10-CM

## 2024-11-15 LAB
EST. AVERAGE GLUCOSE BLD GHB EST-MCNC: 111 MG/DL
HBA1C MFR BLD: 5.5 % (ref 0–5.6)

## 2024-11-15 RX ORDER — ACETAMINOPHEN 325 MG/1
650 TABLET ORAL EVERY 8 HOURS PRN
Qty: 90 TABLET | Refills: 0 | Status: SHIPPED | OUTPATIENT
Start: 2024-11-15 | End: 2024-11-15

## 2024-11-15 RX ORDER — ACETAMINOPHEN 325 MG/1
650 TABLET ORAL EVERY 8 HOURS PRN
Qty: 180 TABLET | Refills: 0 | Status: SHIPPED | OUTPATIENT
Start: 2024-11-15

## 2024-11-15 SDOH — HEALTH STABILITY: PHYSICAL HEALTH: ON AVERAGE, HOW MANY DAYS PER WEEK DO YOU ENGAGE IN MODERATE TO STRENUOUS EXERCISE (LIKE A BRISK WALK)?: 7 DAYS

## 2024-11-15 ASSESSMENT — SOCIAL DETERMINANTS OF HEALTH (SDOH): HOW OFTEN DO YOU GET TOGETHER WITH FRIENDS OR RELATIVES?: THREE TIMES A WEEK

## 2024-11-15 NOTE — PROGRESS NOTES
Preventive Care Visit  [facility]  Sofiya Lay MD, Family Medicine  Nov 15, 2024    Assessment & Plan     Routine general medical examination at a health care facility  ACP documents given to patient to review and complete at home.  Up-to-date on DEXA scan and Cologuard.  Mammogram in 10/2023 was negative; with shared decision making, patient will wait to repeat mammogram until next year which is appropriate.  Received flu and COVID shots today; letter written for patient to receive help in obtaining shingles vaccines at pharmacy.    Prediabetes  A1c last year was 5.8%.  - HEMOGLOBIN A1C    Hyperlipidemia LDL goal <100  Started on Crestor 20 mg daily after lipid panel results last year.  - Lipid panel reflex to direct LDL Non-fasting    Arthritis of knee  Discussed knee x-ray findings of OA from 6/2024. Patient finding benefit with Voltaren gel and Tylenol.  Does not want to consider injections at this point and OA is not severe enough to warrant surgery at this point.  Discussed increasing dose frequency of Voltaren and Tylenol to improve benefit.  May want to avoid further NSAIDs at this time given also on losartan, but could consider this in the future  - diclofenac (VOLTAREN) 1 % topical gel  Dispense: 100 g; Refill: 2  - acetaminophen (TYLENOL) 325 MG tablet  Dispense: 180 tablet; Refill: 0    Bilateral hearing loss, unspecified hearing loss type  Longstanding.  Did not like hearing aids in the past.    Essential hypertension, benign  Blood pressure elevated today.  Did take medication today.  Daughter reports SBP at home was 130s today.  No change in medication dose at this time.  BMP unremarkable in 6/2024.    Age-related osteoporosis without current pathological fracture  Continue alendronate.  Up-to-date on DEXA scan.      Counseling  Appropriate preventive services were addressed with this patient via screening, questionnaire, or discussion as appropriate for fall prevention,  nutrition, physical activity, Tobacco-use cessation, social engagement, weight loss and cognition.  Checklist reviewing preventive services available has been given to the patient.  The patient was instructed to see the dentist every 6 months.    Return in about 53 weeks (around 11/21/2025) for Annual Wellness Visit.    Damien Francois is a 69 year old, presenting for the following:  Physical and Other (65+ annual wellness , knees and joint pain, also xray result)        11/15/2024     2:07 PM   Additional Questions   Roomed by rocio   Accompanied by daughter         11/15/2024    Information    services provided? Yes   Language Omani   Type of interpretation provided Face-to-face    name Karyn Heladoi Parish (HELADIO)    Agency [employer]  Reviewed June knee x-rays with patient - OA and effusion of knees mildly progressed. Knee swelling is the same since June. She is afraid of injections but has been applying Voltaren gel and taking Tylenol and this has been helpful. Applies Voltaren BID. She has done physical therapy in the past.    Has had hearing tests done in the past, 13 years ago. Tried wearing hearing aids but they caused noise in her ears.    Daughter and patient do not think her mental status has changed since last year.    Has not seen a dentist this year yet.    Health Care Directive  Patient does not have a Health Care Directive: Discussed advance care planning with patient; information given to patient to review.      11/15/2024   General Health   How would you rate your overall physical health? (!) POOR   Feel stress (tense, anxious, or unable to sleep) Not at all            11/15/2024   Nutrition   Diet: Low salt    Low fat/cholesterol       Multiple values from one day are sorted in reverse-chronological order         11/15/2024   Exercise   Days per week of moderate/strenous exercise 7 days            11/15/2024   Social Factors   Frequency of gathering  with friends or relatives Three times a week   Worry food won't last until get money to buy more No   Food not last or not have enough money for food? No   Do you have housing? (Housing is defined as stable permanent housing and does not include staying ouside in a car, in a tent, in an abandoned building, in an overnight shelter, or couch-surfing.) Yes   Are you worried about losing your housing? No   Lack of transportation? No   Unable to get utilities (heat,electricity)? No            11/15/2024   Fall Risk   Fallen 2 or more times in the past year? No    Trouble with walking or balance? No        Patient-reported          11/15/2024   Activities of Daily Living- Home Safety   Needs help with the following daily activites Transportation    Preparing meals    Housework    Medication administration    Dressing   Safety concerns in the home No grab bars in the bathroom       Multiple values from one day are sorted in reverse-chronological order         11/15/2024   Dental   Dentist two times every year? (!) NO            11/15/2024   Hearing Screening   Hearing concerns? (!) IT'S HARD TO FOLLOW A CONVERSATION IN A NOISY RESTAURANT OR CROWDED ROOM.    (!) TROUBLE UNDERSTANDING SOFT OR WHISPERED SPEECH.    (!) TROUBLE UNDERSTANDING SPEECH ON THE TELEPHONE       Multiple values from one day are sorted in reverse-chronological order         11/15/2024   Driving Risk Screening   Patient/family members have concerns about driving No            11/15/2024   General Alertness/Fatigue Screening   Have you been more tired than usual lately? No            11/15/2024   Urinary Incontinence Screening   Bothered by leaking urine in past 6 months No            5/17/2024   TB Screening   Were you born outside of the [place]? Yes            Today's PHQ-2 Score:       11/15/2024     2:27 PM   PHQ-2 ( 1999 Pfizer)   Q1: Little interest or pleasure in doing things 0    Q2: Feeling down, depressed or hopeless 0    PHQ-2 Score 0    Q1: Little  interest or pleasure in doing things Not at all   Q2: Feeling down, depressed or hopeless Not at all   PHQ-2 Score 0       Patient-reported           11/15/2024   Substance Use   Alcohol more than 3/day or more than 7/wk No   Do you have a current opioid prescription? No   How severe/bad is pain from 1 to 10? 10/10   Do you use any other substances recreationally? No        Social History     Tobacco Use    Smoking status: Never    Smokeless tobacco: Never   Vaping Use    Vaping status: Never Used   Substance Use Topics    Alcohol use: No    Drug use: Never           10/19/2023   LAST FHS-7 RESULTS   1st degree relative breast or ovarian cancer No   Any relative bilateral breast cancer No   Any male have breast cancer No   Any ONE woman have BOTH breast AND ovarian cancer No   Any woman with breast cancer before 50yrs No   2 or more relatives with breast AND/OR ovarian cancer No   2 or more relatives with breast AND/OR bowel cancer No     Mammogram Screening - Mammogram every 1-2 years updated in Health Maintenance based on mutual decision making    History of abnormal Pap smear: No - age 65 or older with adequate negative prior screening test results (3 consecutive negative cytology results, 2 consecutive negative cotesting results, or 2 consecutive negative HrHPV test results within 10 years, with the most recent test occurring within the recommended screening interval for the test used)     ASCVD Risk   The 10-year ASCVD risk score (Elvia JOHNSON, et al., 2019) is: 23.8%    Values used to calculate the score:      Age: 69 years      Sex: Female      Is Non- : No      Diabetic: No      Tobacco smoker: No      Systolic Blood Pressure: 176 mmHg      Is BP treated: Yes      HDL Cholesterol: 36 mg/dL      Total Cholesterol: 241 mg/dL      Reviewed and updated as needed this visit by Provider   Tobacco  Allergies  Meds  Problems  Med Hx  Surg Hx  Fam Hx            Current providers  "sharing in care for this patient include:  Patient Care Team:  Lainey Tracy MD as PCP - General (Family Practice)  Lainey Tracy MD as Assigned PCP    The following health maintenance items are reviewed in Epic and correct as of today:  Health Maintenance   Topic Date Due    ZOSTER IMMUNIZATION (1 of 2) Never done    LIPID  09/29/2024    BMP  06/03/2025    MAMMO SCREENING  10/19/2025    MEDICARE ANNUAL WELLNESS VISIT  11/15/2025    A1C  11/15/2025    FALL RISK ASSESSMENT  11/15/2025    COLORECTAL CANCER SCREENING  09/27/2026    GLUCOSE  06/03/2027    ADVANCE CARE PLANNING  11/15/2029    RSV VACCINE (1 - 1-dose 75+ series) 08/12/2030    DTAP/TDAP/TD IMMUNIZATION (5 - Td or Tdap) 10/13/2031    DEXA  10/19/2038    HEPATITIS C SCREENING  Completed    PHQ-2 (once per calendar year)  Completed    INFLUENZA VACCINE  Completed    Pneumococcal Vaccine: 65+ Years  Completed    COVID-19 Vaccine  Completed    HPV IMMUNIZATION  Aged Out    MENINGITIS IMMUNIZATION  Aged Out    RSV MONOCLONAL ANTIBODY  Aged Out    PAP  Discontinued       Pertinent positives and negatives as noted in HPI.       Objective    Exam  BP (!) 176/111   Pulse 76   Temp 99  F (37.2  C) (Oral)   Resp 18   Ht 1.494 m (4' 10.8\")   Wt 69 kg (152 lb 3.2 oz)   SpO2 98%   BMI 30.95 kg/m     Estimated body mass index is 30.95 kg/m  as calculated from the following:    Height as of this encounter: 1.494 m (4' 10.8\").    Weight as of this encounter: 69 kg (152 lb 3.2 oz).    Physical Exam  GENERAL: alert and no distress  EYES: Eyes grossly normal to inspection, PERRL and conjunctivae and sclerae normal  HENT: ear canals and TM's normal, nose and mouth without ulcers or lesions  NECK: no adenopathy, no asymmetry, masses, or scars  RESP: lungs clear to auscultation - no rales, rhonchi or wheezes  CV: regular rate and rhythm, normal S1 S2, no S3 or S4, no murmur, click or rub, no peripheral edema  ABDOMEN: soft, nontender, and bowel sounds " normal  MS: no gross musculoskeletal defects noted, no edema  SKIN: no suspicious lesions or rashes  NEURO: Normal strength and tone, mentation intact and speech normal, [place]  PSYCH: mentation appears normal, affect normal/bright        11/15/2024   Mini Cog   Clock Draw Score 0 Abnormal   3 Item Recall 3 objects recalled   Mini Cog Total Score 3        Patient precepted with Renetta Echevarria MD.    Signed Electronically by: Sofiya Lay MD

## 2024-11-15 NOTE — PATIENT INSTRUCTIONS
Nice to see you today!    Here's what we talked about:  - You can take two tablets of Tylenol (total 650 mg) up to three times a day, and apply the Voltaren gel to your elbows and knees up to four times a day for pain.  - I will send you a letter with your results if they are normal, and will call you if they are not normal.  - Try to see a dentist before the end of this year.  - Make an appointment with your pharmacy to get the shingles shots.    Patient Education   Preventive Care Advice   This is general advice given by our system to help you stay healthy. However, your care team may have specific advice just for you. Please talk to your care team about your preventive care needs.  Nutrition  Eat 5 or more servings of fruits and vegetables each day.  Try wheat bread, brown rice and whole grain pasta (instead of white bread, rice, and pasta).  Get enough calcium and vitamin D. Check the label on foods and aim for 100% of the RDA (recommended daily allowance).  Lifestyle  Exercise at least 150 minutes each week  (30 minutes a day, 5 days a week).  Do muscle strengthening activities 2 days a week. These help control your weight and prevent disease.  No smoking.  Wear sunscreen to prevent skin cancer.  Have a dental exam and cleaning every 6 months.  Yearly exams  See your health care team every year to talk about:  Any changes in your health.  Any medicines your care team has prescribed.  Preventive care, family planning, and ways to prevent chronic diseases.  Shots (vaccines)   HPV shots (up to age 26), if you've never had them before.  Hepatitis B shots (up to age 59), if you've never had them before.  COVID-19 shot: Get this shot when it's due.  Flu shot: Get a flu shot every year.  Tetanus shot: Get a tetanus shot every 10 years.  Pneumococcal, hepatitis A, and RSV shots: Ask your care team if you need these based on your risk.  Shingles shot (for age 50 and up)  General health tests  Diabetes  screening:  Starting at age 35, Get screened for diabetes at least every 3 years.  If you are younger than age 35, ask your care team if you should be screened for diabetes.  Cholesterol test: At age 39, start having a cholesterol test every 5 years, or more often if advised.  Bone density scan (DEXA): At age 50, ask your care team if you should have this scan for osteoporosis (brittle bones).  Hepatitis C: Get tested at least once in your life.  STIs (sexually transmitted infections)  Before age 24: Ask your care team if you should be screened for STIs.  After age 24: Get screened for STIs if you're at risk. You are at risk for STIs (including HIV) if:  You are sexually active with more than one person.  You don't use condoms every time.  You or a partner was diagnosed with a sexually transmitted infection.  If you are at risk for HIV, ask about PrEP medicine to prevent HIV.  Get tested for HIV at least once in your life, whether you are at risk for HIV or not.  Cancer screening tests  Cervical cancer screening: If you have a cervix, begin getting regular cervical cancer screening tests starting at age 21.  Breast cancer scan (mammogram): If you've ever had breasts, begin having regular mammograms starting at age 40. This is a scan to check for breast cancer.  Colon cancer screening: It is important to start screening for colon cancer at age 45.  Have a colonoscopy test every 10 years (or more often if you're at risk) Or, ask your provider about stool tests like a FIT test every year or Cologuard test every 3 years.  To learn more about your testing options, visit:   .  For help making a decision, visit:   https://bit.ly/mv48087.  Prostate cancer screening test: If you have a prostate, ask your care team if a prostate cancer screening test (PSA) at age 55 is right for you.  Lung cancer screening: If you are a current or former smoker ages 50 to 80, ask your care team if ongoing lung cancer screenings are right for  you.  For informational purposes only. Not to replace the advice of your health care provider. Copyright   2023 [facility]. All rights reserved. Clinically reviewed by the [facility]. [employer] 716327 - REV 01/24.  Learning About Activities of Daily Living  What are activities of daily living?     Activities of daily living (ADLs) are the basic self-care tasks you do every day. These include eating, bathing, dressing, and moving around.  As you age, and if you have health problems, you may find that it's harder to do some of these tasks. If so, your doctor can suggest ideas that may help.  To measure what kind of help you may need, your doctor will ask how well you are able to do ADLs. Let your doctor know if there are any tasks that you are having trouble doing. This is an important first step to getting help. And when you have the help you need, you can stay as independent as possible.  How will a doctor assess your ADLs?  Asking about ADLs is part of a routine health checkup your doctor will likely do as you age. Your health check might be done in a doctor's office, in your home, or at a hospital. The goal is to find out if you are having any problems that could make it hard to care for yourself or that make it unsafe for you to be on your own.  To measure your ADLs, your doctor will ask how hard it is for you to do routine tasks. Your doctor may also want to know if you have changed the way you do a task because of a health problem. Your doctor may watch how you:  Walk back and forth.  Keep your balance while you stand or walk.  Move from sitting to standing or from a bed to a chair.  Button or unbutton a shirt or sweater.  Remove and put on your shoes.  It's common to feel a little worried or anxious if you find you can't do all the things you used to be able to do. Talking with your doctor about ADLs is a way to make sure you're as safe as possible and able to care for  yourself as well as you can. You may want to bring a caregiver, friend, or family member to your checkup. They can help you talk to your doctor.  Follow-up care is a key part of your treatment and safety. Be sure to make and go to all appointments, and call your doctor if you are having problems. It's also a good idea to know your test results and keep a list of the medicines you take.  Current as of: October 24, 2023  Content Version: 14.2    2024 [facility] [employer].   Care instructions adapted under license by your healthcare professional. If you have questions about a medical condition or this instruction, always ask your healthcare professional. [employer] disclaims any warranty or liability for your use of this information.    Hearing Loss: Care Instructions  Overview     Hearing loss is a sudden or slow decrease in how well you hear. It can range from slight to profound. Permanent hearing loss can occur with aging. It also can happen when you are exposed long-term to loud noise. Examples include listening to loud music, riding motorcycles, or being around other loud machines.  Hearing loss can affect your work and home life. It can make you feel lonely or depressed. You may feel that you have lost your independence. But hearing aids and other devices can help you hear better and feel connected to others.  Follow-up care is a key part of your treatment and safety. Be sure to make and go to all appointments, and call your doctor if you are having problems. It's also a good idea to know your test results and keep a list of the medicines you take.  How can you care for yourself at home?  Avoid loud noises whenever possible. This helps keep your hearing from getting worse.  Always wear hearing protection around loud noises.  Wear a hearing aid as directed.  A professional can help you pick a hearing aid that will work best for you.  You can also get hearing aids over the counter for mild to  "moderate hearing loss.  Have hearing tests as your doctor suggests. They can show whether your hearing has changed. Your hearing aid may need to be adjusted.  Use other devices as needed. These may include:  Telephone amplifiers and hearing aids that can connect to a television, stereo, radio, or microphone.  Devices that use lights or vibrations. These alert you to the doorbell, a ringing telephone, or a baby monitor.  Television closed-captioning. This shows the words at the bottom of the screen. Most new TVs can do this.  TTY (text telephone). This lets you type messages back and forth on the telephone instead of talking or listening. These devices are also called TDD. When messages are typed on the keyboard, they are sent over the phone line to a receiving TTY. The message is shown on a monitor.  Use text messaging, social media, and email if it is hard for you to communicate by telephone.  Try to learn a listening technique called speechreading. It is not lipreading. You pay attention to people's gestures, expressions, posture, and tone of voice. These clues can help you understand what a person is saying. Face the person you are talking to, and have them face you. Make sure the lighting is good. You need to see the other person's face clearly.  Think about counseling if you need help to adjust to your hearing loss.  When should you call for help?  Watch closely for changes in your health, and be sure to contact your doctor if:    You think your hearing is getting worse.     You have new symptoms, such as dizziness or nausea.   Where can you learn more?  Go to https://www.[employer].net/patiented  Enter R798 in the search box to learn more about \"Hearing Loss: Care Instructions.\"  Current as of: September 27, 2023  Content Version: 14.2 2024 [facility] [employer].   Care instructions adapted under license by your healthcare professional. If you have questions about a medical condition or this instruction, " always ask your healthcare professional. [employer] disclaims any warranty or liability for your use of this information.

## 2024-11-15 NOTE — LETTER
November 15, 2024      Priscila MELÉNDEZ Watkins  [address] [place] [address]        To whom it may concern,    Priscila is due for her shingles vaccines. Please assist her in getting this done at the pharmacy.      Sincerely,        Sofiya Lay MD

## 2024-11-15 NOTE — PROGRESS NOTES
Critical Vital Report    Did patient have a critical vital during today's visit? Yes  Which vital is reporting as critical?:   Blood Pressure    I personally notified the following: Provider  Preceptor  RN (Type name here)    Action(s) taken: I left room and notified provider personally      Rambo Vicente MA

## 2024-11-15 NOTE — LETTER
November 18, 2024      Priscila Watkins  [address] [place] [address]        Dear ,    We are writing to inform you of your test results.    Your cholesterol result is now improved and at goal after starting your cholesterol medication. Please keep taking it and keep up the great work! Your A1c (screening for diabetes) is normal, which is also great. Please call our clinic if you have any questions.     Resulted Orders   HEMOGLOBIN A1C   Result Value Ref Range    Estimated Average Glucose 111 <117 mg/dL    Hemoglobin A1C 5.5 0.0 - 5.6 %      Comment:      Normal <5.7%   Prediabetes 5.7-6.4%    Diabetes 6.5% or higher     Note: Adopted from ADA consensus guidelines.   Lipid panel reflex to direct LDL Non-fasting   Result Value Ref Range    Cholesterol 197 <200 mg/dL    Triglycerides 213 (H) <150 mg/dL    Direct Measure HDL 56 >=50 mg/dL    LDL Cholesterol Calculated 98 <100 mg/dL    Non HDL Cholesterol 141 (H) <130 mg/dL    Patient Fasting > 8hrs? Unknown     Narrative    Cholesterol  Desirable: < 200 mg/dL  Borderline High: 200 - 239 mg/dL  High: >= 240 mg/dL    Triglycerides  Normal: < 150 mg/dL  Borderline High: 150 - 199 mg/dL  High: 200-499 mg/dL  Very High: >= 500 mg/dL    Direct Measure HDL  Female: >= 50 mg/dL   Male: >= 40 mg/dL    LDL Cholesterol  Desirable: < 100 mg/dL  Above Desirable: 100 - 129 mg/dL   Borderline High: 130 - 159 mg/dL   High:  160 - 189 mg/dL   Very High: >= 190 mg/dL    Non HDL Cholesterol  Desirable: < 130 mg/dL  Above Desirable: 130 - 159 mg/dL  Borderline High: 160 - 189 mg/dL  High: 190 - 219 mg/dL  Very High: >= 220 mg/dL       If you have any questions or concerns, please call the clinic at the number listed above.       Sincerely,      Sofiya Lay MD

## 2024-11-15 NOTE — PROGRESS NOTES
Preceptor Attestation:  I discussed the patient with the resident and evaluated the patient in person. I have verified the content of the note, which accurately reflects my assessment of the patient and the plan of care.  Supervising Physician:  Renetta Echevarria MD.

## 2024-11-16 LAB
CHOLEST SERPL-MCNC: 197 MG/DL
FASTING STATUS PATIENT QL REPORTED: ABNORMAL
HDLC SERPL-MCNC: 56 MG/DL
LDLC SERPL CALC-MCNC: 98 MG/DL
NONHDLC SERPL-MCNC: 141 MG/DL
TRIGL SERPL-MCNC: 213 MG/DL

## 2024-11-18 NOTE — RESULT ENCOUNTER NOTE
LDL now at goal of < 100 after starting rosuvastatin 20 mg daily. A1c normal/improved. Letter sent to patient.

## 2024-12-19 NOTE — PROGRESS NOTES
Assessment & Plan     Acute bronchitis, unspecified organism  Cough  Differential diagnosis includes viral upper respiratory tract infection, bronchitis, bacterial pneumonia.  In setting of wheeze on exam and phlegm production I am more suspicious of a bronchitis picture versus a consolidated pneumonia.  With the history of slight hemoptysis will obtain a chest x-ray to rule out tuberculosis versus some type of neoplastic process.  Will treat with a Z-Cuauhtemoc and symptomatic cares for her cough.  No improvement by the end of the week return to clinic.  - guaiFENesin-dextromethorphan (ROBITUSSIN DM) 100-10 MG/5ML syrup; Take 10 mLs by mouth every 4 hours as needed for cough  Dispense: 236 mL; Refill: 3  - acetaminophen (TYLENOL) 325 MG tablet; Take 1-2 tablets (325-650 mg) by mouth every 6 hours as needed for mild pain  Dispense: 60 tablet; Refill: 0  - benzonatate (TESSALON) 100 MG capsule; Take 1 capsule (100 mg) by mouth 3 times daily as needed for cough  Dispense: 45 capsule; Refill: 0  - XR CHEST 2 VW; Future  - azithromycin (ZITHROMAX) 250 MG tablet; Take 2 tablets (500 mg) by mouth daily for 1 day, THEN 1 tablet (250 mg) daily for 4 days.  Dispense: 6 tablet; Refill: 0    Encounter for screening for COVID-19  - Symptomatic COVID-19 Virus (Coronavirus) by PCR Nose    Benign essential hypertension  - lisinopril (ZESTRIL) 10 MG tablet; Take 1 tablet (10 mg) by mouth daily  Dispense: 90 tablet; Refill: 1        Patient Instructions   Plan for today:      I suspect you have a bronchitis episode with your cough with phlegm along with the pain.    Chest XR today    Start the antibiotic for 5 days - return to clinic if no improvement after finishing this therapy.    Take the Tessalon pearls during the day to help with cough. Take the liquid medicine before bed.    Start the lisinopril again. Come back to clinic to recheck blood pressure in two weeks.        Return in about 2 weeks (around 12/14/2021) for Blood pressure  After review, patient admit status changed to observation. CM met with patient at bedside to discuss status change to observation. CM provided patient with MOON document and FYWB, all questions answered. Patient signed and understood. Copies left at bedside and placed in medical records.      Inessa Barfield-Clinical   [phone]     check.    Sabine Sandy MD  [facility] JAMIE Francois is a 66 year old who presents for the following health issues     Chief Complaints and History of Present Illnesses   Patient presents with     Cough     continous cough, worse at night, coughing blood, fatigue, SOB, abdomen pain/chest pain due to cough, onset 5-6 days ago      Medication Refill     lisinopril        HPI     Cough:  Presents today for evaluation of a 1 week history of cough.  She notes that her cough is worse at night, she has a dry cough but she cannot rest flat sleep at night.  She feels she cannot get enough air but does not feel short of breath or like she is drowning while sleeping.  She also states that her throat gets very sore along with the anterior side of her chest due to cough.  She has not tried any medicine at home to make this better.  She does occasionally cough up phlegm that is clear, no purulence but it is occasionally blood-streaked.  She has not had any fevers or sick contacts.  No nausea or vomiting, though endorses some vague abdominal pain secondary to the cough. No smoking history, no tuberculosis history.  Is fully vaccinated for COVID-19.     Blood pressure:  Notes that blood pressure is elevated on exam today. She did not take her medicine, she ran out of her lisinopril 10 mg about 2 days ago. She also has not wanted to take BP meds in setting of acute illness. Does monitor at home, range is 120-130s/70s typically. No headache or changes in vision. Chest pain as above. No palpitations or tremors.     Review of Systems   Constitutional, HEENT, cardiovascular, pulmonary, gi and gu systems are negative, except as otherwise noted.      Objective    BP (!) 169/73   Pulse 87   Temp 99.3  F (37.4  C) (Oral)   Resp 18   SpO2 98%   There is no height or weight on file to calculate BMI.     Physical Exam   GENERAL: healthy, alert and no distress. Intermittent dry cough on  exam.  HEENT: EOMI, no nasal discharge, cobblestone erythema in posterior pharynx.   NECK: no adenopathy, no asymmetry, masses, or scars and thyroid normal to palpation  RESP: Faint end expiratory wheeze (low pitched) appreciated with first breath sounds, not audible with repeated deep breathing. No crackles at lung bases. CTA on upper lung fields.  CV: regular rate and rhythm, normal S1 S2, no S3 or S4, no murmur, click or rub, no peripheral edema and peripheral pulses strong  ABDOMEN: soft, nontender, no hepatosplenomegaly, no masses and bowel sounds normal  MS: no gross musculoskeletal defects noted, no edema  SKIN: no suspicious lesions or rashes  NEURO: Normal strength and tone, mentation intact and speech normal  BACK: no CVA tenderness, no paralumbar tenderness  PSYCH: mentation appears normal, affect normal/bright  LYMPH: no cervical, supraclavicular, axillary, or inguinal adenopathy    CXR: Personally reviewed, No evidence of effusion or focal consolidation, no cardiomegaly appreciated.

## 2025-01-22 ENCOUNTER — OFFICE VISIT (OUTPATIENT)
Dept: FAMILY MEDICINE | Facility: CLINIC | Age: 70
End: 2025-01-22
Payer: COMMERCIAL

## 2025-01-22 VITALS
BODY MASS INDEX: 30.34 KG/M2 | HEART RATE: 73 BPM | DIASTOLIC BLOOD PRESSURE: 89 MMHG | RESPIRATION RATE: 16 BRPM | OXYGEN SATURATION: 98 % | WEIGHT: 149.2 LBS | TEMPERATURE: 97.4 F | SYSTOLIC BLOOD PRESSURE: 159 MMHG

## 2025-01-22 DIAGNOSIS — M71.21 POPLITEAL CYST, RIGHT: ICD-10-CM

## 2025-01-22 DIAGNOSIS — I10 BENIGN ESSENTIAL HYPERTENSION: ICD-10-CM

## 2025-01-22 DIAGNOSIS — M17.10 ARTHRITIS OF KNEE: Primary | ICD-10-CM

## 2025-01-22 RX ORDER — ACETAMINOPHEN 325 MG/1
650 TABLET ORAL EVERY 8 HOURS PRN
Qty: 180 TABLET | Refills: 0 | Status: SHIPPED | OUTPATIENT
Start: 2025-01-22

## 2025-01-22 NOTE — PROGRESS NOTES
"  Assessment & Plan     Arthritis of knee  Posterior knee fullness suspect Bakers cyst, right  Based on her history of osteoarthritis both knees and the physical examination, this presentation could suggest popliteal cyst of posterior knee.  Management includes treating underlying cause, which is still osteoarthritis. Will refill diclofenac gel and acetaminophen so she could continue treatment for osteoarthritis..  Patient was educated on weight loss to reduce stress on her knees and worsen her osteoarthritis.  Patient is agreeable to PT. There is no concern for unstable knee with new ligament damage of both knees.   - diclofenac (VOLTAREN) 1 % topical gel; Apply 2 g topically 4 times daily.  - acetaminophen (TYLENOL) 325 MG tablet; Take 2 tablets (650 mg) by mouth every 8 hours as needed for mild pain.  - Physical Therapy  Referral; Future    Benign essential hypertension  Patient's blood pressure was elevated today.  However, she does monitor her blood pressure at home and they were normal past few.  She does admit that every time she shows up to clinic her blood pressure is elevated. This is suggestive of whitecoat hypertension.  Patient was told to record her blood pressures at home for her next follow-up appointment to determine whether medication adjustment is needed.  -Continue losartan 25 mg daily         BMI  Estimated body mass index is 30.34 kg/m  as calculated from the following:    Height as of 11/15/24: 1.494 m (4' 10.8\").    Weight as of this encounter: 67.7 kg (149 lb 3.2 oz).       Discussed this patient with Dr. Nickerson, who agrees with assessment and plan    Return in about 6 weeks (around 3/5/2025).    Damien Francois is a 69 year old, presenting for the following health issues:  Other (Joint pain on knees and elbows. All joints hurt )    HPI   This is a 69-year-old female with mild osteoarthritis of both knees presenting here for bilateral knee pain.  Patient continues to have knee pain " on both sides.  The right knee is worse than the left.  She especially has noticed posterior right knee pain recently.  Diclofenac gel and acetaminophen has helped somewhat, but seems to be less effective recently.  Patient denies any trauma to her right knee.  Denies any fevers, rashes, or mechanical symptoms such as catching.  Patient has run out of diclofenac gel and acetaminophen and wants refills on these.  Patient does do some exercises at home to strengthen  her legs.  Patient has not formally seen PT.                Objective    BP (!) 159/89   Pulse 73   Temp 97.4  F (36.3  C) (Oral)   Resp 16   Wt 67.7 kg (149 lb 3.2 oz)   SpO2 98%   BMI 30.34 kg/m    Body mass index is 30.34 kg/m .    Physical Exam   GENERAL: alert and no distress  RESP: lungs clear to auscultation - no rales, rhonchi or wheezes  CV: regular rate and rhythm, normal S1 S2, no S3 or S4, no murmur, click or rub, no peripheral edema  MS: no gross musculoskeletal defects noted, no edema.    -Right knee : Right knee mass in the popliteal area was felt that was tender.  This knee was compared to the contralateral knee  SKIN: no suspicious lesions or rashes  NEURO: Normal strength and tone, mentation intact and speech normal  PSYCH: mentation appears normal, affect normal/bright            Signed Electronically by: Colleen Kwok MD

## 2025-02-12 DIAGNOSIS — M17.10 ARTHRITIS OF KNEE: ICD-10-CM

## 2025-02-12 NOTE — TELEPHONE ENCOUNTER
Name from pharmacy: ACETAMINOPHEN 325MG TABLETS          Will file in chart as: acetaminophen (TYLENOL) 325 MG tablet    Sig: TAKE 2 TABLETS(650 MG) BY MOUTH EVERY 8 HOURS AS NEEDED FOR MILD PAIN    Disp: 180 tablet    Refills: 0 (Pharmacy requested: Not specified)    Start: 2/12/2025    Class: E-Prescribe    Non-formulary For: Arthritis of knee    Last ordered: 3 weeks ago (1/22/2025) by Emeka Nickerson MD    Last refill: 1/22/2025    Rx #: [id number]    Analgesics (Non-Narcotic Tylenol and ASA Only) [id number]/12/2025 03:16 PM   Protocol Details Medication is active on med list and the sig matches. RN to manually verify dose and sig if red X/fail.    Medication matches indication        HAYDEN Luke, BSN

## 2025-02-13 RX ORDER — ACETAMINOPHEN 325 MG/1
TABLET ORAL
Qty: 180 TABLET | Refills: 0 | Status: SHIPPED | OUTPATIENT
Start: 2025-02-13

## 2025-02-17 ENCOUNTER — TRANSFERRED RECORDS (OUTPATIENT)
Dept: HEALTH INFORMATION MANAGEMENT | Facility: CLINIC | Age: 70
End: 2025-02-17
Payer: COMMERCIAL

## 2025-03-04 ENCOUNTER — OFFICE VISIT (OUTPATIENT)
Dept: FAMILY MEDICINE | Facility: CLINIC | Age: 70
End: 2025-03-04
Payer: COMMERCIAL

## 2025-03-04 VITALS
SYSTOLIC BLOOD PRESSURE: 165 MMHG | RESPIRATION RATE: 16 BRPM | BODY MASS INDEX: 30.84 KG/M2 | HEART RATE: 89 BPM | DIASTOLIC BLOOD PRESSURE: 90 MMHG | OXYGEN SATURATION: 100 % | WEIGHT: 153 LBS | HEIGHT: 59 IN | TEMPERATURE: 97.5 F

## 2025-03-04 DIAGNOSIS — M81.0 AGE-RELATED OSTEOPOROSIS WITHOUT CURRENT PATHOLOGICAL FRACTURE: ICD-10-CM

## 2025-03-04 DIAGNOSIS — M17.10 ARTHRITIS OF KNEE: ICD-10-CM

## 2025-03-04 DIAGNOSIS — I10 PRIMARY HYPERTENSION: ICD-10-CM

## 2025-03-04 RX ORDER — NAPROXEN 250 MG/1
250 TABLET ORAL 2 TIMES DAILY PRN
Qty: 60 TABLET | Refills: 1 | Status: SHIPPED | OUTPATIENT
Start: 2025-03-04

## 2025-03-04 RX ORDER — ACETAMINOPHEN 325 MG/1
325-650 TABLET ORAL EVERY 6 HOURS PRN
Qty: 180 TABLET | Refills: 3 | Status: SHIPPED | OUTPATIENT
Start: 2025-03-04

## 2025-03-04 RX ORDER — LOSARTAN POTASSIUM 100 MG/1
100 TABLET ORAL DAILY
Qty: 90 TABLET | Refills: 3 | Status: SHIPPED | OUTPATIENT
Start: 2025-03-04

## 2025-03-04 RX ORDER — ALENDRONATE SODIUM 70 MG/1
70 TABLET ORAL
Qty: 12 TABLET | Refills: 3 | Status: SHIPPED | OUTPATIENT
Start: 2025-03-04

## 2025-03-04 NOTE — PATIENT INSTRUCTIONS
Based on our discussion, I have outlined the following instructions for you:    - Increase your losartan dose.  - Check your blood pressure at home regularly.  - Schedule a follow-up appointment in April to check your blood pressure again.  - Refill your topical diclofenac with larger tubes for knee pain.  - Order a knee sleeve for extra support.  - Take your new medication with food to help prevent any stomach upset.  - Refill your alendronate prescription to help manage osteoporosis.    Thank you again for your visit, and we look forward to supporting you in your journey to better health.

## 2025-03-04 NOTE — PROGRESS NOTES
Assessment & Plan   Primary hypertension.  Blood pressure remains elevated despite current losartan 25 mg dose.  - Increase losartan dose to 100 mg daily.  - Monitor blood pressure at home.  - Follow-up appointment in April to reassess blood pressure.  Plan to update BMP at that visit.    Arthritis of knee.  Arthritis confirmed by previous X-ray. Pain management with topical diclofenac and oral acetaminophen.  - Refill topical diclofenac with larger quantity.  - Order knee sleeve for support.  - Discussed option of knee injections but patient prefers to manage more conservatively because she feels pain is not severe enough to warrant injections.  - Add naproxen 250 mg BID PRN; advised to take with food.    Age-related osteoporosis without current pathological fracture.  Ongoing management of osteoporosis to prevent fractures.  - Refill alendronate 70 mg weekly.    Orders placed  - losartan (COZAAR) 100 MG tablet; Take 1 tablet (100 mg) by mouth daily.  Dispense: 90 tablet; Refill: 3  - diclofenac (VOLTAREN) 1 % topical gel; Apply 2 g topically 4 times daily.  Dispense: 350 g; Refill: 2  - acetaminophen (TYLENOL) 325 MG tablet; Take 1-2 tablets (325-650 mg) by mouth every 6 hours as needed for mild pain.  Dispense: 180 tablet; Refill: 3  - Ankle/Knee Bracing Supplies Order Knee Sleeve/Brace; Right; Closed  - naproxen (NAPROSYN) 250 MG tablet; Take 1 tablet (250 mg) by mouth 2 times daily as needed (knee arthritis). Take with food  Dispense: 60 tablet; Refill: 1  - alendronate (FOSAMAX) 70 MG tablet; Take 1 tablet (70 mg) by mouth every 7 days. .  Take before first meal of day; do not lie down for 30 minutes after.  Dispense: 12 tablet; Refill: 3    Return in about 1 month (around 4/4/2025) for knee pain, BP.  Future Appointments   Date Time Provider Department Center   4/11/2025  2:10 PM Lainey Tracy MD [facility]   Priscila Watkins is a 69-year-old female with a history of primary hypertension,  arthritis of the knee, and age-related osteoporosis without current pathological fracture.    Bilateral Knee Pain  Ms. Watkins reports experiencing bilateral knee pain for the past three to four months, with the right knee being worse than the left. The pain is described as aching and sometimes severe enough to affect her breathing. She has been using topical diclofenac and oral acetaminophen, but these have become less effective over time. An X-ray taken last summer showed arthritis in her knees. She has been doing exercises at home to strengthen her legs but has not seen a physical therapist. A likely Baker's cyst was noted on the right knee posteriorly during a previous exam.    Hypertension  She mentions that her blood pressure has been consistently high, even while taking her prescribed medication, losartan 25 mg. Her blood pressure was recorded as 159/89 at a previous visit on January 22, 2025. She checks her blood pressure at home, with readings around .    Osteoporosis  Ms. Watkins is aware of her osteoporosis and is taking measures to strengthen her bones to prevent fractures in case of falls. She has been on alendronate for this condition.    Social: She reports that she has never smoked. She has never used smokeless tobacco. She reports that she does not drink alcohol and does not use drugs.    Current Outpatient Medications   Medication Sig Dispense Refill    acetaminophen (TYLENOL) 325 MG tablet Take 1-2 tablets (325-650 mg) by mouth every 6 hours as needed for mild pain. 180 tablet 3    alendronate (FOSAMAX) 70 MG tablet Take 1 tablet (70 mg) by mouth every 7 days. .  Take before first meal of day; do not lie down for 30 minutes after. 12 tablet 3    diclofenac (VOLTAREN) 1 % topical gel Apply 2 g topically 4 times daily. 350 g 2    losartan (COZAAR) 100 MG tablet Take 1 tablet (100 mg) by mouth daily. 90 tablet 3    naproxen (NAPROSYN) 250 MG tablet Take 1 tablet (250 mg) by mouth 2 times daily as  "needed (knee arthritis). Take with food 60 tablet 1    rosuvastatin (CRESTOR) 20 MG tablet Take 1 tablet (20 mg) by mouth daily 90 tablet 3     No current facility-administered medications for this visit.     Accompanied by daughter.  Phone  used: [place].    Objective   Vitals: BP (!) 165/90   Pulse 89   Temp 97.5  F (36.4  C) (Tympanic)   Resp 16   Ht 1.494 m (4' 10.82\")   Wt 69.4 kg (153 lb)   SpO2 100%   BMI 31.09 kg/m    General: Pleasant. Older woman. No distress.  Heart: Regular rate and rhythm. No murmurs, rubs, or gallops.  Lungs: Clear to auscultation bilaterally. No wheezes or crackles. Good air movement.  Knee: Wearing knee sleeve on right knee.  Normal to inspection.  No effusion.  Able to bear weight.  Full ROM in extension and flexion.  Psych: Appropriate grooming and hygiene. Speech normal rate. Appropriate mood and affect.    Labs reviewed:  No visits with results within 1 Month(s) from this visit.   Latest known visit with results is:   Office Visit on 11/15/2024   Component Date Value Ref Range Status    Estimated Average Glucose 11/15/2024 111  <117 mg/dL Final    Hemoglobin A1C 11/15/2024 5.5  0.0 - 5.6 % Final    Normal <5.7%   Prediabetes 5.7-6.4%    Diabetes 6.5% or higher     Note: Adopted from ADA consensus guidelines.    Cholesterol 11/15/2024 197  <200 mg/dL Final    Triglycerides 11/15/2024 213 (H)  <150 mg/dL Final    Direct Measure HDL 11/15/2024 56  >=50 mg/dL Final    LDL Cholesterol Calculated 11/15/2024 98  <100 mg/dL Final    Non HDL Cholesterol 11/15/2024 141 (H)  <130 mg/dL Final    Patient Fasting > 8hrs? 11/15/2024 Unknown   Final      Imaging reviewed  EXAM: XR KNEE BILATERAL 3 VIEWS  LOCATION: [facility]  DATE: 6/18/2024  INDICATION: Arthritis of knee.  COMPARISON: Radiographs from 9/24/2019.  IMPRESSION:   RIGHT KNEE: Mild osteoarthrosis of the medial compartment. Mild osteoarthrosis of the lateral compartment. Mild osteoarthrosis of " the patellofemoral compartment. Moderate effusion. The findings have all mildly progressed.  LEFT KNEE: Mild osteoarthrosis of the lateral compartment, mildly progressed. Mild osteoarthrosis of the patellofemoral compartment, unchanged. Small effusion again noted. Calcified intra-articular bodies again noted.    -----------------------  This note was created with the assistance of an ambient AI scribe and has been reviewed and verified for accuracy by the authoring provider.

## 2025-04-11 ENCOUNTER — OFFICE VISIT (OUTPATIENT)
Dept: FAMILY MEDICINE | Facility: CLINIC | Age: 70
End: 2025-04-11
Payer: COMMERCIAL

## 2025-04-11 VITALS
HEIGHT: 59 IN | OXYGEN SATURATION: 99 % | TEMPERATURE: 97.6 F | HEART RATE: 82 BPM | DIASTOLIC BLOOD PRESSURE: 96 MMHG | WEIGHT: 155.4 LBS | SYSTOLIC BLOOD PRESSURE: 177 MMHG | RESPIRATION RATE: 16 BRPM | BODY MASS INDEX: 31.33 KG/M2

## 2025-04-11 DIAGNOSIS — M17.10 ARTHRITIS OF KNEE: ICD-10-CM

## 2025-04-11 PROCEDURE — 3077F SYST BP >= 140 MM HG: CPT | Performed by: FAMILY MEDICINE

## 2025-04-11 PROCEDURE — 3079F DIAST BP 80-89 MM HG: CPT | Performed by: FAMILY MEDICINE

## 2025-04-11 PROCEDURE — 99214 OFFICE O/P EST MOD 30 MIN: CPT | Performed by: FAMILY MEDICINE

## 2025-04-11 RX ORDER — NAPROXEN 250 MG/1
250 TABLET ORAL 2 TIMES DAILY PRN
Qty: 60 TABLET | Refills: 1 | Status: SHIPPED | OUTPATIENT
Start: 2025-04-11

## 2025-04-11 NOTE — PROGRESS NOTES
Assessment & Plan   Hypertension, on losartan, currently taking 25-50 mg daily with BP then in systolic 150s-180 range.  - Increase losartan to 100 mg daily.   - Risks and side effects: Discussed that 100 mg is a safe dose and will be monitored with a BMP after two weeks to ensure no harm.    Follow-up in 2 weeks for BP recheck on increased losartan, and come early for lab first (BMP).  Future Appointments   Date Time Provider Department Center   4/30/2025  9:20 AM Lainey Tracy MD [facility]   Priscila Watkins is a 69 year old female with a history including HTN, prediabetes, arthritis, and osteoporosis who presents to discuss HTN.    Ms. Watkins is currently on losartan 100 mg daily for blood pressure management. At the last visit, her dosage was increased from 25 mg to 100 mg due to a blood pressure reading of 165/90. She was fearful of the 100 mg dose, thinking it may be too strong, so she instead took two of her 25 mg pills for a total of 50 mg.  Her systolic BP on home measures was 150s.  She is since running low on the 25 mg tablets after doubling up, so she has been taking 25 mg daily recently, and her blood pressure today is 180/83.    Social: She reports that she has never smoked. She has never used smokeless tobacco. She reports that she does not drink alcohol and does not use drugs.    Current Outpatient Medications   Medication Sig Dispense Refill    acetaminophen (TYLENOL) 325 MG tablet Take 1-2 tablets (325-650 mg) by mouth every 6 hours as needed for mild pain. 180 tablet 3    alendronate (FOSAMAX) 70 MG tablet Take 1 tablet (70 mg) by mouth every 7 days. .  Take before first meal of day; do not lie down for 30 minutes after. 12 tablet 3    diclofenac (VOLTAREN) 1 % topical gel Apply 2 g topically 4 times daily. 350 g 2    losartan (COZAAR) 100 MG tablet Take 1 tablet (100 mg) by mouth daily. 90 tablet 3    naproxen (NAPROSYN) 250 MG tablet Take 1 tablet (250 mg) by mouth 2 times daily  "as needed (knee arthritis). Take with food 60 tablet 1    rosuvastatin (CRESTOR) 20 MG tablet Take 1 tablet (20 mg) by mouth daily 90 tablet 3     Objective   Vitals: BP (!) 177/96   Pulse 82   Temp 97.6  F (36.4  C) (Tympanic)   Resp 16   Ht 1.494 m (4' 10.82\")   Wt 70.5 kg (155 lb 6.4 oz)   SpO2 99%   BMI 31.58 kg/m    General: Pleasant. Older woman. No distress.  Heart: Regular rate and rhythm. No murmurs, rubs, or gallops.  Lungs: Clear to auscultation bilaterally. No wheezes or crackles. Good air movement.  Psych: Appropriate grooming and hygiene. Speech normal rate. Appropriate mood and affect.    Labs Reviewed:  No visits with results within 1 Month(s) from this visit.   Latest known visit with results is:   Office Visit on 11/15/2024   Component Date Value Ref Range Status    Estimated Average Glucose 11/15/2024 111  <117 mg/dL Final    Hemoglobin A1C 11/15/2024 5.5  0.0 - 5.6 % Final    Cholesterol 11/15/2024 197  <200 mg/dL Final    Triglycerides 11/15/2024 213 (H)  <150 mg/dL Final    Direct Measure HDL 11/15/2024 56  >=50 mg/dL Final    LDL Cholesterol Calculated 11/15/2024 98  <100 mg/dL Final    Non HDL Cholesterol 11/15/2024 141 (H)  <130 mg/dL Final    Patient Fasting > 8hrs? 11/15/2024 Unknown   Final      -----------------------  Portions of this note were created with the assistance of an ambient AI scribe.  The content was reviewed and verified for accuracy by the authoring provider.  "

## 2025-04-30 ENCOUNTER — OFFICE VISIT (OUTPATIENT)
Dept: FAMILY MEDICINE | Facility: CLINIC | Age: 70
End: 2025-04-30
Payer: COMMERCIAL

## 2025-04-30 VITALS
BODY MASS INDEX: 30.76 KG/M2 | OXYGEN SATURATION: 99 % | HEIGHT: 59 IN | SYSTOLIC BLOOD PRESSURE: 161 MMHG | DIASTOLIC BLOOD PRESSURE: 89 MMHG | TEMPERATURE: 97.7 F | HEART RATE: 81 BPM | WEIGHT: 152.6 LBS | RESPIRATION RATE: 16 BRPM

## 2025-04-30 DIAGNOSIS — Z86.73 HISTORY OF TIA (TRANSIENT ISCHEMIC ATTACK) AND STROKE: Primary | ICD-10-CM

## 2025-04-30 DIAGNOSIS — I10 PRIMARY HYPERTENSION: Primary | ICD-10-CM

## 2025-04-30 DIAGNOSIS — I10 PRIMARY HYPERTENSION: ICD-10-CM

## 2025-04-30 LAB
ANION GAP SERPL CALCULATED.3IONS-SCNC: 8 MMOL/L (ref 3–14)
BUN SERPL-MCNC: 11 MG/DL (ref 7–30)
CALCIUM SERPL-MCNC: 9.6 MG/DL (ref 8.5–10.1)
CHLORIDE BLD-SCNC: 105 MMOL/L (ref 94–109)
CO2 SERPL-SCNC: 30 MMOL/L (ref 20–32)
CREAT SERPL-MCNC: 0.9 MG/DL (ref 0.52–1.04)
EGFRCR SERPLBLD CKD-EPI 2021: 69 ML/MIN/1.73M2
GLUCOSE BLD-MCNC: 80 MG/DL (ref 70–99)
POTASSIUM BLD-SCNC: 4.2 MMOL/L (ref 3.4–5.3)
SODIUM SERPL-SCNC: 143 MMOL/L (ref 135–145)

## 2025-04-30 PROCEDURE — 36415 COLL VENOUS BLD VENIPUNCTURE: CPT | Performed by: FAMILY MEDICINE

## 2025-04-30 PROCEDURE — 3078F DIAST BP <80 MM HG: CPT | Performed by: FAMILY MEDICINE

## 2025-04-30 PROCEDURE — 80048 BASIC METABOLIC PNL TOTAL CA: CPT | Performed by: FAMILY MEDICINE

## 2025-04-30 PROCEDURE — 99214 OFFICE O/P EST MOD 30 MIN: CPT | Performed by: FAMILY MEDICINE

## 2025-04-30 PROCEDURE — 3077F SYST BP >= 140 MM HG: CPT | Performed by: FAMILY MEDICINE

## 2025-04-30 NOTE — PROGRESS NOTES
Assessment & Plan   Routine hypertension follow-up, adjusting dose of losartan.  She is now on 100 mg. Blood pressure remains high despite being on the maximum dose of current medication (100 mg) at 161/89 in clinic.  At home though, it is max 145 systolic, usually around 140/80.  Possible white coat hypertension.  -- BMP checked today.  Slight bump in Cr (0.70 baseline -> 0.90) but within 30%.  No hyperkalemia (K+ 4.2).  -- Continue losartan 100.  -- She would like additional work-up into possible secondary conditions, specifically her heart.  Given her age (69) and that she is only requiring 1 medication, I suspect primary hypertension but we'll check an Echo.    Follow-up depending on results of Echo.  Maintain home BP log.  Future Appointments   Date Time Provider Department Center   6/13/2025  9:00 AM [facility] ST[facility]     Subjective   Priscila Watkins is a 69 year old female with a history including HTN, prediabetes, arthritis, and osteoporosis who presents for BP follow-up.    Ms. Watkins has been experiencing elevated blood pressure readings, and we have been titrating up her medication (losartan) from 25 to 50 to now 100 mg. At home, her blood pressure readings have varied, with values around 140/80 and occasionally reaching highest of 145 systolic.  She reports that her blood pressure used to be normal, with occasional readings of 140, but recently 140s has been the norm.  Ms. Watkins denies taking any over-the-counter medications, herbal teas, or supplements that could contribute to her high blood pressure. She also mentions that her dietary salt intake has not increased. She takes fish oil and a multivitamin daily.     Social: She reports that she has never smoked. She has never used smokeless tobacco. She reports that she does not drink alcohol and does not use drugs.    Current Outpatient Medications   Medication Sig Dispense Refill    acetaminophen (TYLENOL) 325 MG tablet Take 1-2 tablets (325-650 mg)  "by mouth every 6 hours as needed for mild pain. 180 tablet 3    alendronate (FOSAMAX) 70 MG tablet Take 1 tablet (70 mg) by mouth every 7 days. .  Take before first meal of day; do not lie down for 30 minutes after. 12 tablet 3    diclofenac (VOLTAREN) 1 % topical gel Apply 2 g topically 4 times daily. 350 g 2    losartan (COZAAR) 100 MG tablet Take 1 tablet (100 mg) by mouth daily. 90 tablet 3    naproxen (NAPROSYN) 250 MG tablet Take 1 tablet (250 mg) by mouth 2 times daily as needed (knee arthritis). Take with food 60 tablet 1    rosuvastatin (CRESTOR) 20 MG tablet Take 1 tablet (20 mg) by mouth daily 90 tablet 3     Objective   Vitals: BP (!) 161/89   Pulse 81   Temp 97.7  F (36.5  C) (Tympanic)   Resp 16   Ht 1.494 m (4' 10.82\")   Wt 69.2 kg (152 lb 9.6 oz)   SpO2 99%   BMI 31.01 kg/m    General: Pleasant. Older woman. No distress.  Heart: Regular rate and rhythm. No murmurs, rubs, or gallops.  Lungs: Clear to auscultation bilaterally. No wheezes or crackles. Good air movement.  Psych: Appropriate grooming and hygiene. Speech normal rate. Appropriate mood and affect.    Labs:  Results for orders placed or performed in visit on 04/30/25   Basic metabolic panel     Status: Normal   Result Value Ref Range    Sodium 143 135 - 145 mmol/L    Potassium 4.2 3.4 - 5.3 mmol/L    Chloride 105 94 - 109 mmol/L    Carbon Dioxide (CO2) 30 20 - 32 mmol/L    Anion Gap 8 3 - 14 mmol/L    Urea Nitrogen 11 7 - 30 mg/dL    Creatinine 0.90 0.52 - 1.04 mg/dL    GFR Estimate 69 >60 mL/min/1.73m2    Calcium 9.6 8.5 - 10.1 mg/dL    Glucose 80 70 - 99 mg/dL     -----------------------  Portions of this note were created with the assistance of an ambient AI scribe.  The content was reviewed and verified for accuracy by the authoring provider.  "

## 2025-05-20 DIAGNOSIS — M81.0 AGE-RELATED OSTEOPOROSIS WITHOUT CURRENT PATHOLOGICAL FRACTURE: ICD-10-CM

## 2025-05-20 RX ORDER — ALENDRONATE SODIUM 70 MG/1
TABLET ORAL
Qty: 12 TABLET | Refills: 3 | Status: SHIPPED | OUTPATIENT
Start: 2025-05-20

## 2025-05-20 NOTE — TELEPHONE ENCOUNTER
Name from pharmacy: ALENDRONATE 70MG TABLETS          Will file in chart as: alendronate (FOSAMAX) 70 MG tablet    Sig: TAKE 1 TABLET BY MOUTH EVERY 7 DAYS. TAKE BEFORE FIRST MEAL OF THE DAY, DO NOT LIE DOWN FOR 30 MINUTES AFTER    Disp: 12 tablet    Refills: 3 (Pharmacy requested: Not specified)    Start: 5/20/2025    Class: E-Prescribe    Non-formulary For: Age-related osteoporosis without current pathological fracture    Last ordered: 2 months ago (3/4/2025) by Lainey Tracy MD    Last refill: 5/17/2024    Rx #: [id number]    Bisphosphonates [id number]/20/2025 03:36 PM   Protocol Details Medication is active on med list and the sig matches. RN to manually verify dose and sig if red X/fail.    Dexa scan completed in the past 48-months    Medication indicated for associated diagnosis    Recent (12 month) or future (90 days) visit with authorizing provider's specialty (provided they have been seen in the past 15 months)    Most recent Creatinine Clearance in last 12 months >35    Patient is age 18 or older        Prescription approved per [facility] Refill Protocol.  HAYDEN Luke, BSN

## 2025-06-13 ENCOUNTER — HOSPITAL ENCOUNTER (OUTPATIENT)
Dept: CARDIOLOGY | Facility: HOSPITAL | Age: 70
Discharge: HOME | End: 2025-06-13
Attending: FAMILY MEDICINE | Admitting: FAMILY MEDICINE
Payer: COMMERCIAL

## 2025-06-13 DIAGNOSIS — Z86.73 HISTORY OF TIA (TRANSIENT ISCHEMIC ATTACK) AND STROKE: ICD-10-CM

## 2025-06-13 LAB — LVEF ECHO: NORMAL

## 2025-06-13 PROCEDURE — 93306 TTE W/DOPPLER COMPLETE: CPT

## 2025-06-17 ENCOUNTER — RESULTS FOLLOW-UP (OUTPATIENT)
Dept: FAMILY MEDICINE | Facility: CLINIC | Age: 70
End: 2025-06-17

## 2025-06-18 DIAGNOSIS — M17.10 ARTHRITIS OF KNEE: ICD-10-CM

## 2025-06-18 NOTE — TELEPHONE ENCOUNTER
Name from pharmacy: NAPROXEN 250MG TABLETS          Will file in chart as: naproxen (NAPROSYN) 250 MG tablet    Sig: TAKE 1 TABLET(250 MG) BY MOUTH TWICE DAILY WITH FOOD AS NEEDED FOR KNEE ARTHRITIS    Disp: 60 tablet    Refills: 1 (Pharmacy requested: Not specified)    Start: 6/18/2025    Class: E-Prescribe    Non-formulary For: Arthritis of knee    Last ordered: 2 months ago (4/11/2025) by Lainey Tracy MD    Last refill: 5/15/2025    Rx #: [id number]    NSAID Medications [id number]/18/2025 12:37 PM   Protocol Details Most recent blood pressure under 140/90 in past 12 months    Patient is age 6-64 years    Normal CBC on file in past 12 months    Medication is active on med list and the sig matches. RN to manually verify dose and sig if red X/fail.    Always Fail Criteria - Chart Review Required        HAYDEN Luke, BSN

## 2025-06-19 RX ORDER — NAPROXEN 250 MG/1
TABLET ORAL
Qty: 60 TABLET | Refills: 1 | Status: SHIPPED | OUTPATIENT
Start: 2025-06-19

## 2025-08-13 DIAGNOSIS — M17.10 ARTHRITIS OF KNEE: ICD-10-CM

## 2025-08-13 DIAGNOSIS — E78.5 HYPERLIPIDEMIA LDL GOAL <100: ICD-10-CM

## 2025-08-13 RX ORDER — ROSUVASTATIN CALCIUM 20 MG/1
20 TABLET, COATED ORAL DAILY
Qty: 90 TABLET | Refills: 3 | Status: SHIPPED | OUTPATIENT
Start: 2025-08-13